# Patient Record
Sex: MALE | Race: WHITE | Employment: OTHER | URBAN - NONMETROPOLITAN AREA
[De-identification: names, ages, dates, MRNs, and addresses within clinical notes are randomized per-mention and may not be internally consistent; named-entity substitution may affect disease eponyms.]

---

## 2017-06-29 ENCOUNTER — APPOINTMENT (EMERGENCY)
Dept: RADIOLOGY | Facility: HOSPITAL | Age: 37
End: 2017-06-29
Payer: COMMERCIAL

## 2017-06-29 ENCOUNTER — HOSPITAL ENCOUNTER (EMERGENCY)
Facility: HOSPITAL | Age: 37
Discharge: HOME | End: 2017-06-29
Attending: EMERGENCY MEDICINE | Admitting: EMERGENCY MEDICINE
Payer: COMMERCIAL

## 2017-06-29 VITALS
BODY MASS INDEX: 33.13 KG/M2 | SYSTOLIC BLOOD PRESSURE: 176 MMHG | TEMPERATURE: 97.6 F | HEART RATE: 68 BPM | HEIGHT: 73 IN | RESPIRATION RATE: 16 BRPM | WEIGHT: 250 LBS | OXYGEN SATURATION: 96 % | DIASTOLIC BLOOD PRESSURE: 72 MMHG

## 2017-06-29 DIAGNOSIS — G62.9 NEUROPATHY: ICD-10-CM

## 2017-06-29 DIAGNOSIS — S97.112A CRUSHING INJURY OF LEFT GREAT TOE, INITIAL ENCOUNTER: Primary | ICD-10-CM

## 2017-06-29 PROCEDURE — 99283 EMERGENCY DEPT VISIT LOW MDM: CPT

## 2017-06-29 PROCEDURE — 73630 X-RAY EXAM OF FOOT: CPT

## 2017-06-29 RX ORDER — FUROSEMIDE 40 MG/1
40 TABLET ORAL 2 TIMES DAILY
COMMUNITY
End: 2018-02-13

## 2017-06-29 RX ORDER — OXYCODONE AND ACETAMINOPHEN 10; 325 MG/1; MG/1
1 TABLET ORAL EVERY 4 HOURS PRN
COMMUNITY

## 2017-06-29 RX ORDER — CLONIDINE HYDROCHLORIDE 0.3 MG/1
0.3 TABLET ORAL 3 TIMES DAILY
COMMUNITY

## 2017-06-29 RX ORDER — FERROUS SULFATE 325(65) MG
325 TABLET ORAL 2 TIMES DAILY WITH MEALS
COMMUNITY

## 2017-06-29 RX ORDER — DOXAZOSIN 2 MG/1
2 TABLET ORAL
COMMUNITY

## 2017-06-29 RX ORDER — ALPRAZOLAM 1 MG/1
TABLET ORAL
COMMUNITY

## 2017-06-29 RX ORDER — METOPROLOL TARTRATE 50 MG/1
50 TABLET, FILM COATED ORAL EVERY EVENING
COMMUNITY
End: 2018-02-13

## 2017-06-29 RX ORDER — LOSARTAN POTASSIUM 100 MG/1
100 TABLET ORAL DAILY
COMMUNITY

## 2017-06-29 RX ORDER — MELATONIN
1000 2 TIMES DAILY
COMMUNITY

## 2017-06-29 RX ORDER — NIFEDIPINE 60 MG/1
60 TABLET, FILM COATED, EXTENDED RELEASE ORAL 2 TIMES DAILY
COMMUNITY

## 2017-06-29 RX ORDER — HYDRALAZINE HYDROCHLORIDE 50 MG/1
50 TABLET, FILM COATED ORAL 3 TIMES DAILY
COMMUNITY

## 2017-06-29 RX ORDER — ASPIRIN 81 MG/1
81 TABLET, CHEWABLE ORAL DAILY
COMMUNITY

## 2018-02-13 ENCOUNTER — APPOINTMENT (EMERGENCY)
Dept: RADIOLOGY | Facility: HOSPITAL | Age: 38
End: 2018-02-13
Payer: COMMERCIAL

## 2018-02-13 ENCOUNTER — HOSPITAL ENCOUNTER (EMERGENCY)
Facility: HOSPITAL | Age: 38
Discharge: HOME | End: 2018-02-14
Attending: EMERGENCY MEDICINE | Admitting: EMERGENCY MEDICINE
Payer: COMMERCIAL

## 2018-02-13 VITALS
OXYGEN SATURATION: 100 % | HEIGHT: 73 IN | TEMPERATURE: 98.9 F | RESPIRATION RATE: 18 BRPM | SYSTOLIC BLOOD PRESSURE: 190 MMHG | DIASTOLIC BLOOD PRESSURE: 83 MMHG | HEART RATE: 67 BPM | BODY MASS INDEX: 32.01 KG/M2 | WEIGHT: 241.5 LBS

## 2018-02-13 DIAGNOSIS — T14.8XXA CRUSH INJURY: ICD-10-CM

## 2018-02-13 DIAGNOSIS — S92.301A FRACTURE OF UNSPECIFIED METATARSAL BONE(S), RIGHT FOOT, INITIAL ENCOUNTER FOR CLOSED FRACTURE: Primary | ICD-10-CM

## 2018-02-13 DIAGNOSIS — R03.0 ELEVATED BLOOD PRESSURE READING: ICD-10-CM

## 2018-02-13 PROCEDURE — 73630 X-RAY EXAM OF FOOT: CPT

## 2018-02-13 RX ORDER — METOPROLOL SUCCINATE 50 MG/1
50 TABLET, EXTENDED RELEASE ORAL EVERY EVENING
COMMUNITY
Start: 2017-12-11

## 2018-02-13 RX ORDER — METOPROLOL TARTRATE 100 MG/1
100 TABLET ORAL EVERY MORNING
COMMUNITY
End: 2018-02-13

## 2018-02-13 RX ORDER — METOPROLOL SUCCINATE 100 MG/1
100 TABLET, EXTENDED RELEASE ORAL EVERY MORNING
COMMUNITY
Start: 2017-12-11

## 2018-02-13 RX ORDER — CHLORTHALIDONE 50 MG/1
50 TABLET ORAL 2 TIMES DAILY
COMMUNITY

## 2018-02-13 RX ORDER — HYDROCODONE BITARTRATE AND ACETAMINOPHEN 5; 325 MG/1; MG/1
1 TABLET ORAL ONCE
Status: COMPLETED | OUTPATIENT
Start: 2018-02-13 | End: 2018-02-13

## 2018-02-13 RX ADMIN — HYDROCODONE BITARTRATE AND ACETAMINOPHEN 1 TABLET: 5; 325 TABLET ORAL at 23:00

## 2018-02-14 PROCEDURE — 99283 EMERGENCY DEPT VISIT LOW MDM: CPT

## 2018-02-14 NOTE — DISCHARGE INSTRUCTIONS
Make sure you take off your splint several times a day and check for foot 4 ulcerations as you are diabetic  Do not wear your splint while sleeping  Make sure you follow-up with Orthopedics as well as Podiatry  Was unable to reduce your great toe  Splint Care   AMBULATORY CARE:   Splint care  is important to help protect your splint until it comes off  Some splints are made of fiberglass or plaster that will need to dry and harden  Splint care will help the splint dry and harden correctly  Even after your splint hardens, it can be damaged  Seek care immediately if:   · You have increased pain  · Your fingers or toes are numb or tingling  · You feel burning or stinging around your injury  · Your nails, fingers, or toes turn pale, blue, or gray, and feel cold  · You have new or increased trouble moving your fingers or toes  · Your swelling gets worse  · The skin under your splint is bleeding or leaking pus  Contact your healthcare provider if:   · Your hard splint gets wet or is damaged  · You have a fever  · Your splint feels tighter  · You have itchy, dry skin under your splint that is getting worse  · The skin under your splint is red, or you have a new sore  · You notice a bad smell coming from your splint  · You have questions or concerns about your condition or care  How to care for your splint:   · Wait for your hard splint to harden completely  You may have to wait up to 3 days before you can walk on a plaster splint  · Check your splint and the skin around it each day  Check your splint for damage, such as cracks and breaks  Check your skin for redness, increased swelling, and sores  Loosen the elastic bandage around your splint if it feels too tight  · Keep your splint clean and dry  Keep dirt out of your splint  Before you bathe, wrap your hard splint with 2 layers of plastic  Then put a plastic bag over it  Keep the plastic bag tightly sealed   You can also ask your healthcare provider about waterproof shields  Do not put your hard splint in the water , even with a plastic bag over it  A wet splint can make your skin itchy, and may lead to infection  · Do not put powders or deodorants inside your splint  These can dry your skin and increase itching  · Do not try to scratch the skin inside your hard splint with sharp objects  Sharp objects can break off inside your splint or hurt your skin  · Do not pull the padding out of your splint  The padding inside your splint protects your skin  You may develop a sore on your skin if you take out the padding  Follow up with your healthcare provider as directed within 1 to 2 weeks:  Write down your questions so you remember to ask them during your visits  © 2017 [address] [employer] Information is for End User's use only and may not be sold, redistributed or otherwise used for commercial purposes  All illustrations and images included in CareNotes® are the copyrighted property of [employer]  or Candido Abad  The above information is an  only  It is not intended as medical advice for individual conditions or treatments  Talk to your doctor, nurse or pharmacist before following any medical regimen to see if it is safe and effective for you  MAKE SURE YOU CALL PODIATRY FOR POSSIBLE GREAT TOE SUBLUXATION THAT WE COULD NOT REDUCE    YOU MUST FOLLOW UP WITH ORTHO !! Foot Fracture in Adults   WHAT YOU NEED TO KNOW:   A foot fracture is a break in one or more of the bones in your foot  Foot fractures are commonly caused by trauma, falls, or repeated stress injuries  DISCHARGE INSTRUCTIONS:   Medicines:   · Antibiotics: This medicine is given to help treat or prevent an infection caused by bacteria  · NSAIDs:  These medicines decrease swelling and pain  NSAIDs are available without a doctor's order  Ask which medicine is right for you   Ask how much to take and when to take it  Take as directed  NSAIDs can cause stomach bleeding and kidney problems if not taken correctly  · Pain medicine: You may be given a prescription medicine to decrease pain  Do not wait until the pain is severe before you take this medicine  · Take your medicine as directed  Contact your healthcare provider if you think your medicine is not helping or if you have side effects  Tell him or her if you are allergic to any medicine  Keep a list of the medicines, vitamins, and herbs you take  Include the amounts, and when and why you take them  Bring the list or the pill bottles to follow-up visits  Carry your medicine list with you in case of an emergency  Follow up with your healthcare provider or bone specialist as directed: You may need to return to have your cast, splint, external fixation devices, or stitches removed  You may also need to return for tests to make sure your foot is healing  Write down your questions so you remember to ask them during your visits  Pin care: If you have pins in your foot, you will need to clean them daily  Cleaning the pins can help prevent an infection  Ask for more information about pin care  Wound care:  Carefully wash the wound with soap and water  Dry the area and put on new, clean bandages as directed  Change your bandages when they get wet or dirty  Self-care:   · Rest:  You may need to rest your foot and avoid activities that cause pain  For stress fractures, you will need to avoid the activity that caused the fracture until it heals  Ask when you can return to your normal activities such as work and sports  · Ice:  Ice helps decrease swelling and pain  Ice may also help prevent tissue damage  Use an ice pack or put crushed ice in a plastic bag  Cover it with a towel, and place it on your foot for 15 to 20 minutes every hour as directed  · Elevate your foot:  Raise your foot at or above the level of your heart as often as you can   This will help decrease swelling and pain  Prop your foot on pillows or blankets to keep it elevated comfortably  · Physical therapy: Once your foot has healed, a physical therapist can teach you exercises to help improve movement and strength, and to decrease pain  Cast or splint care:   · Check the skin around your cast and splint daily for any redness or open areas  · Do not use a sharp or pointed object to scratch your skin under the cast or splint  · Do not remove your splint unless your healthcare provider or orthopedic surgeon says it is okay  Bathing with a cast or splint:  Do not let your cast or splint get wet  Before bathing, cover the cast or splint with a plastic bag  Tape the bag to your skin above the cast or splint to seal out the water  Keep your foot out of the water in case the bag leaks  Ask when it is okay to take a bath or shower  Assistive devices: You may be given a hard-soled shoe to wear while your foot is healing  You also may need to use crutches to help you walk while your foot heals  It is important to use your crutches correctly  Ask for more information about how to use crutches  Contact your healthcare provider or bone specialist if:   · You have a fever  · You have new sores around your boot, cast, or splint  · You have new or worsening trouble moving your foot  · You notice a foul smell coming from under your cast     · Your boot, cast, or splint gets damaged  · You have questions or concerns about your condition or care  Seek care immediately or call 911 if:   · The pain in your injured foot gets worse even after you rest and take pain medicine  · The skin or toes of your foot become numb, swollen, cold, white, or blue  · You have more pain or swelling than you did before the cast was put on  · Your wound is draining fluid or pus  · Blood soaks through your bandage  · Your leg feels warm, tender, and painful  It may look swollen and red      · You suddenly feel lightheaded and short of breath  · You have chest pain when you take a deep breath or cough  You may cough up blood  © 2017 [address] [employer] Information is for End User's use only and may not be sold, redistributed or otherwise used for commercial purposes  All illustrations and images included in CareNotes® are the copyrighted property of A [employer] M [employer]  or Candido Abad  The above information is an  only  It is not intended as medical advice for individual conditions or treatments  Talk to your doctor, nurse or pharmacist before following any medical regimen to see if it is safe and effective for you

## 2018-02-14 NOTE — ED PROVIDER NOTES
History  Chief Complaint   Patient presents with    Foot Injury     Pt reports he dropped a ladder onto his right foot this afternoon  Right foot swollen and bruised  Patient is a 49-year-old male with a history of chronic kidney disease stage 4, diabetes, he is not on dialysis coming in today after he dropped a ladder on his foot earlier this afternoon  He did not fall, hit his head, lose consciousness  Patient with pain, swelling, tenderness throughout his right foot  He has difficulty walking on this foot  History provided by:  Patient   used: No    Foot Injury - Major   Location:  Foot  Injury: yes    Mechanism of injury comment:  Ladder fell on foot  Foot location:  R foot  Pain details:     Quality: Pain  Severity:  Moderate    Onset quality:  Sudden    Timing:  Constant    Progression:  Worsening  Chronicity:  New  Dislocation: no    Foreign body present:  No foreign bodies  Prior injury to area:  No  Relieved by:  None tried  Worsened by:  Bearing weight  Ineffective treatments:  None tried  Associated symptoms: swelling    Associated symptoms: no back pain, no decreased ROM, no fatigue, no fever, no itching, no muscle weakness, no neck pain, no numbness, no stiffness and no tingling    Risk factors: no concern for non-accidental trauma, no frequent fractures, no known bone disorder, no obesity and no recent illness        Prior to Admission Medications   Prescriptions Last Dose Informant Patient Reported? Taking?    ALPRAZolam (XANAX) 1 mg tablet   Yes Yes   Sig: Take by mouth daily at bedtime as needed for anxiety   NIFEdipine ER (ADALAT CC) 60 MG 24 hr tablet   Yes Yes   Sig: Take 60 mg by mouth 2 (two) times a day   aspirin 81 mg chewable tablet   Yes Yes   Sig: Chew 81 mg daily   chlorthalidone (HYGROTEN) 50 MG tablet   Yes Yes   Sig: Take 50 mg by mouth 2 (two) times a day   cholecalciferol (VITAMIN D3) 1,000 units tablet   Yes Yes   Sig: Take 1,000 Units by mouth 2 (two) times a day   cloNIDine (CATAPRES) 0 3 mg tablet   Yes Yes   Sig: Take 0 3 mg by mouth 3 (three) times a day   doxazosin (CARDURA) 2 mg tablet   Yes Yes   Sig: Take 2 mg by mouth daily at bedtime   ferrous sulfate 325 (65 Fe) mg tablet   Yes Yes   Sig: Take 325 mg by mouth 2 (two) times a day with meals     hydrALAZINE (APRESOLINE) 50 mg tablet   Yes Yes   Sig: Take 50 mg by mouth 3 (three) times a day   insulin aspart (NovoLOG) 100 units/mL injection   Yes Yes   Sig: Inject under the skin 3 (three) times a day before meals Sliding scale and carb count    losartan (COZAAR) 100 MG tablet   Yes Yes   Sig: Take 100 mg by mouth daily   lovastatin (ALTOPREV) 40 MG 24 hr tablet   Yes Yes   Sig: Take 40 mg by mouth daily at bedtime   metoprolol succinate (TOPROL-XL) 100 mg 24 hr tablet   Yes Yes   Sig: Take 100 mg by mouth every morning   metoprolol succinate (TOPROL-XL) 50 mg 24 hr tablet   Yes Yes   Sig: Take 50 mg by mouth every evening   oxyCODONE-acetaminophen (PERCOCET)  mg per tablet 2/13/2018 at 0700  Yes Yes   Sig: Take 1 tablet by mouth every 4 (four) hours as needed for moderate pain      Facility-Administered Medications: None       Past Medical History:   Diagnosis Date    Chronic pain     Diabetes mellitus ([facility] [address] )     Hyperlipidemia     Hypertension     Neuropathy     Renal disorder        Past Surgical History:   Procedure Laterality Date    APPENDECTOMY      TONSILLECTOMY         History reviewed  No pertinent family history  I have reviewed and agree with the history as documented  Social History   Substance Use Topics    Smoking status: Current Every Day Smoker     Packs/day: 0 50     Types: Cigarettes    Smokeless tobacco: Never Used    Alcohol use No        Review of Systems   Constitutional: Negative for fatigue and fever  Respiratory: Negative for cough, chest tightness, shortness of breath and wheezing      Musculoskeletal: Negative for back pain, neck pain and stiffness  Skin: Negative for itching and rash  All other systems reviewed and are negative  Physical Exam  ED Triage Vitals [02/13/18 2205]   Temperature Pulse Respirations Blood Pressure SpO2   98 9 °F (37 2 °C) 67 18 (!) 190/83 100 %      Temp Source Heart Rate Source Patient Position - Orthostatic VS BP Location FiO2 (%)   Temporal Monitor Sitting Left arm --      Pain Score       Worst Possible Pain           Orthostatic Vital Signs  Vitals:    02/13/18 2205   BP: (!) 190/83   Pulse: 67   Patient Position - Orthostatic VS: Sitting       Physical Exam   Constitutional: He appears well-developed and well-nourished  HENT:   Head: Normocephalic and atraumatic  Mouth/Throat: Oropharynx is clear and moist    Eyes: Conjunctivae and EOM are normal  Pupils are equal, round, and reactive to light  Neck: Normal range of motion  Pulmonary/Chest: No respiratory distress  Musculoskeletal:        Right ankle: He exhibits decreased range of motion, swelling and ecchymosis  He exhibits no deformity, no laceration and normal pulse  Feet:    Patient with full active range of motion of the bilateral hips, knees, and left ankle  Dorsalis pedis 2+ equal bilateral   Psychiatric: He has a normal mood and affect  His behavior is normal  Judgment and thought content normal    Nursing note and vitals reviewed        ED Medications  Medications   HYDROcodone-acetaminophen (NORCO) 5-325 mg per tablet 1 tablet (1 tablet Oral Given 2/13/18 2300)       Diagnostic Studies  Results Reviewed     None                 XR foot 3+ views RIGHT    (Results Pending)              Procedures  Static Splint Application  Date/Time: 2/14/2018 12:25 AM  Performed by: Gera Hassan  Authorized by: Gera Hassan     Patient location:  Bedside  Procedure performed by emergency physician: No    Other Assisting Provider: Yes (comment)    Consent:     Consent obtained:  Verbal    Consent given by:  Patient    Risks discussed: Discoloration, numbness, pain and swelling    Alternatives discussed:  No treatment  Indication:     Indications: fracture    Pre-procedure details:     Sensation:  Numbness (Per patient this is from his neuropathy)  Procedure details:     Laterality:  Right    Location:  Leg    Leg:  R lower leg    Strapping: no      Splint type: Right posterior  Supplies:  Ortho-Glass  Post-procedure details:     Pain:  Unchanged    Sensation:  Normal    Neurovascular Exam: skin pink      Patient tolerance of procedure: Tolerated well, no immediate complications           Phone Contacts  ED Phone Contact    ED Course  ED Course                                MDM  Number of Diagnoses or Management Options  Crush injury:   Elevated blood pressure reading:   Fracture of unspecified metatarsal bone(s), right foot, initial encounter for closed fracture:   Diagnosis management comments:   11:34 PM  Patient and family aware of pending read on xray and not officially read  Concern for 1st MT and 2nd MT fracture  Questionable subluxation of great toe  Multiple attempts at reduction was unsuccessful  Patient aware of this and remains Nv  Patient does have peripheral neuropathy at baseline  12:25 AM  Splint applied and patient with neuropathy of right lower extremity  He is agreeable for follow-up with Podiatry is also Orthopedics  I discussed with him at length regarding my concern for subluxation and my inability to reduce at this time  Patient splint is applied  Instructions and discussed with him regarding need to performSkin checks several times throughout the day given he is diabetic with neuropathy  patient states he has percocet at home and does not need any pain medications   Patient aware of my concern        Amount and/or Complexity of Data Reviewed  Tests in the radiology section of CPT®: ordered and reviewed  Independent visualization of images, tracings, or specimens: yes (X-ray concerning for fracture at the proximal first metatarsal as well as head of 2nd metatarsal   Questionable 1st partial subluxation )      CritCare Time    Disposition  Final diagnoses:   Fracture of unspecified metatarsal bone(s), right foot, initial encounter for closed fracture   Crush injury     Time reflects when diagnosis was documented in both MDM as applicable and the Disposition within this note     Time User Action Codes Description Comment    2/13/2018 11:31 PM Aidee Lopez Add [S92 301A] Fracture of unspecified metatarsal bone(s), right foot, initial encounter for closed fracture     2/13/2018 11:31 PM Liliane Lopez  [id number]] Crush injury       ED Disposition     None      Follow-up Information     Follow up With Specialties Details Why Cody Sethi MD Family Medicine Schedule an appointment as soon as possible for a visit in 3 days  [address]  [facility] [address]  [phone]      Juan Quinn MD Orthopedic Surgery Schedule an appointment as soon as possible for a visit in 1 day  [address] [facility], [address] [facility]   [address]  [address]  [phone]          Patient's Medications   Discharge Prescriptions    No medications on file     No discharge procedures on file      ED Provider  Electronically Signed by           Litzy Solis DO  02/18/18 1654

## 2018-06-10 ENCOUNTER — HOSPITAL ENCOUNTER (EMERGENCY)
Facility: HOSPITAL | Age: 38
Discharge: HOME | End: 2018-06-10
Attending: EMERGENCY MEDICINE | Admitting: EMERGENCY MEDICINE
Payer: COMMERCIAL

## 2018-06-10 VITALS
TEMPERATURE: 98 F | DIASTOLIC BLOOD PRESSURE: 72 MMHG | BODY MASS INDEX: 30.25 KG/M2 | WEIGHT: 229.28 LBS | RESPIRATION RATE: 17 BRPM | SYSTOLIC BLOOD PRESSURE: 160 MMHG | OXYGEN SATURATION: 98 % | HEART RATE: 60 BPM

## 2018-06-10 DIAGNOSIS — L30.9 DERMATITIS: Primary | ICD-10-CM

## 2018-06-10 PROCEDURE — 96372 THER/PROPH/DIAG INJ SC/IM: CPT

## 2018-06-10 PROCEDURE — 99282 EMERGENCY DEPT VISIT SF MDM: CPT

## 2018-06-10 RX ORDER — METHYLPREDNISOLONE SODIUM SUCCINATE 125 MG/2ML
80 INJECTION, POWDER, LYOPHILIZED, FOR SOLUTION INTRAMUSCULAR; INTRAVENOUS ONCE
Status: COMPLETED | OUTPATIENT
Start: 2018-06-10 | End: 2018-06-10

## 2018-06-10 RX ORDER — PREDNISONE 10 MG/1
TABLET ORAL
Qty: 84 TABLET | Refills: 0 | Status: SHIPPED | OUTPATIENT
Start: 2018-06-10

## 2018-06-10 RX ORDER — LEVOCETIRIZINE DIHYDROCHLORIDE 5 MG/1
5 TABLET, FILM COATED ORAL EVERY EVENING
Qty: 10 TABLET | Refills: 0 | Status: SHIPPED | OUTPATIENT
Start: 2018-06-10 | End: 2018-06-20

## 2018-06-10 RX ORDER — DIPHENHYDRAMINE HCL 25 MG
25 TABLET ORAL ONCE
Status: COMPLETED | OUTPATIENT
Start: 2018-06-10 | End: 2018-06-10

## 2018-06-10 RX ORDER — HYDROXYZINE HYDROCHLORIDE 25 MG/1
25 TABLET, FILM COATED ORAL EVERY 6 HOURS PRN
Qty: 20 TABLET | Refills: 0 | Status: SHIPPED | OUTPATIENT
Start: 2018-06-10

## 2018-06-10 RX ORDER — LORATADINE 10 MG/1
10 TABLET ORAL ONCE
Status: COMPLETED | OUTPATIENT
Start: 2018-06-10 | End: 2018-06-10

## 2018-06-10 RX ORDER — FAMOTIDINE 20 MG/1
20 TABLET, FILM COATED ORAL ONCE
Status: COMPLETED | OUTPATIENT
Start: 2018-06-10 | End: 2018-06-10

## 2018-06-10 RX ADMIN — DIPHENHYDRAMINE HCL 25 MG: 25 TABLET ORAL at 11:58

## 2018-06-10 RX ADMIN — LORATADINE 10 MG: 10 TABLET ORAL at 11:58

## 2018-06-10 RX ADMIN — FAMOTIDINE 20 MG: 20 TABLET ORAL at 11:58

## 2018-06-10 RX ADMIN — METHYLPREDNISOLONE SODIUM SUCCINATE 80 MG: 125 INJECTION, POWDER, FOR SOLUTION INTRAMUSCULAR; INTRAVENOUS at 11:59

## 2018-06-10 NOTE — DISCHARGE INSTRUCTIONS
Poison Ivy   WHAT YOU NEED TO KNOW:   Poison ivy is a plant that can cause an itchy, uncomfortable rash on your skin  Poison ivy grows as a shrub or vine in woods, fields, and areas of thick [place]  It has 3 bright green leaves on each stem that turn red in [place]  DISCHARGE INSTRUCTIONS:   Medicines:   · Antiseptic or drying creams or ointments: These medicines may be used to dry out the rash and decrease the itching  These products may be available without a doctor's order  · Steroids: This medicine helps decrease itching and inflammation  It can be given as a cream to apply to your skin or as a pill  · Antihistamines: This medicine may help decrease itching and help you sleep  It is available without a doctor's order  · Take your medicine as directed  Contact your healthcare provider if you think your medicine is not helping or if you have side effects  Tell him of her if you are allergic to any medicine  Keep a list of the medicines, vitamins, and herbs you take  Include the amounts, and when and why you take them  Bring the list or the pill bottles to follow-up visits  Carry your medicine list with you in case of an emergency  Follow up with your healthcare provider as directed:  Write down your questions so you remember to ask them during your visits  How your poison ivy rash spreads: You cannot spread poison ivy by touching your rash or the liquid from your blisters  Poison ivy is spread only if you scratch your skin while it still has oil on it  You may think your rash is spreading because new rashes appear over a number of days  This happens because areas covered by thin skin break out in a rash first  Your face or forearms may develop a rash before thicker areas, such as the palms of your hands  Self-care:   · Keep your rash clean and dry:  Wash it with soap and water  Gently pat it dry with a clean towel  · Try not to scratch or rub your rash:   This can cause your skin to become infected  · Use a compress on your rash:  Dip a clean washcloth in cool water  Wring it out and place it on your rash  Leave the washcloth on your skin for 15 minutes  Do this at least 3 times per day  · Take a cornstarch or oatmeal bath: If your rash is too large to cover with wet washcloths, take 3 or 4 cornstarch baths daily  Mix 1 pound of cornstarch with a little water to make a paste  Add the paste to a tub full of water and mix well  You may also use colloidal oatmeal in the bath water  Use lukewarm water  Avoid hot water because it may cause your itching to increase  Prevent a poison ivy rash in the future:   · Wear skin protection:  Wear long pants, a long-sleeved shirt, and gloves  Use a skin block lotion to protect your skin from poison ivy oil  You can find this at a drugstore without a prescription  · Wash clothing after possible exposure: If you think you have been near a poison ivy plant, wash the clothes you were wearing separately from other clothes  Rinse the washing machine well after you take the clothes out  Scrub boots and shoes with warm, soapy water  Dry clean items and clothing that you cannot wash in water  Poison ivy oil is sticky and can stay on surfaces for a long time  It can cause a new rash even years later  · Bathe your pet:  Use warm water and shampoo on your pet's fur  This will prevent the spread of oil to your skin, car, and home  Wear long sleeves, long pants, and gloves while washing pets or any items that may have oil on them  · Reduce exposure to poison ivy:  Do not touch plants that look like poison ivy  Keep your yard free of poison ivy  While protecting your skin, remove the plant and the roots  Place them in a plastic bag and seal the bag tightly  · Do not burn poison ivy plants: This can spread the oil through the air  If you breathe the oil into your lungs, you could have swelling and serious breathing problems   Oil that clings to the fire [place]ia can land on your skin and cause a rash  Contact your healthcare provider if:   · You have pus, soft yellow scabs, or tenderness on the rash  · The itching gets worse or keeps you awake at night  · The rash covers more than 1/4 of your skin or spreads to your eyes, mouth, or genital area  · The rash is not better after 2 to 3 weeks  · You have tender, swollen glands on the sides of your neck  · You have swelling in your arms and legs  · You have questions or concerns about your condition or care  Return to the emergency department if:   · You have a fever  · You have redness, swelling, and tenderness around the rash  · You have trouble breathing  © 2017 26[address] [facility] Information is for End User's use only and may not be sold, redistributed or otherwise used for commercial purposes  All illustrations and images included in CareNotes® are the copyrighted property of A D A M , Inc  or Candido Abad  The above information is an  only  It is not intended as medical advice for individual conditions or treatments  Talk to your doctor, nurse or pharmacist before following any medical regimen to see if it is safe and effective for you

## 2018-06-10 NOTE — ED PROVIDER NOTES
History  Chief Complaint   Patient presents with    Rash     States saw family doctor and was put on prednisone for "poison"  states it is worse, on arms and head       History provided by:  Patient and medical records  Rash   Location: bilateral arms, neck, abdomen, left buttock, right foot  Quality: blistering, burning, itchiness, painful, redness and swelling    Quality: not scaling and not weeping    Pain details:     Quality:  Burning, itching and sore    Severity:  Moderate    Onset quality:  Gradual    Duration:  5 days    Timing:  Constant    Progression:  Worsening  Severity:  Moderate  Onset quality:  Gradual  Duration:  5 days  Timing:  Constant  Progression:  Worsening  Chronicity:  New  Context: plant contact (walking in woods  suspected poison ivy)    Context: not exposure to similar rash, not food, not insect bite/sting, not medications, not nuts and not sick contacts    Relieved by:  Nothing  Worsened by:  Nothing  Ineffective treatments:  Antihistamines, anti-itch cream and topical steroids (saw pcp 6/7/18 and prescribed prednisone and is taking (30mg x3d, 20mg x3d, 10mg x3d)  using PM benadryl dose  topical hydrocortisone and calamine lotion during day)  Associated symptoms: no abdominal pain, no diarrhea, no fatigue, no fever, no headaches, no hoarse voice, no induration, no joint pain, no myalgias, no nausea, no periorbital edema, no shortness of breath, no sore throat, no throat swelling, no tongue swelling, no URI, not vomiting and not wheezing        Prior to Admission Medications   Prescriptions Last Dose Informant Patient Reported? Taking?    ALPRAZolam (XANAX) 1 mg tablet   Yes Yes   Sig: Take by mouth daily at bedtime as needed for anxiety   NIFEdipine ER (ADALAT CC) 60 MG 24 hr tablet   Yes Yes   Sig: Take 60 mg by mouth 2 (two) times a day   aspirin 81 mg chewable tablet   Yes Yes   Sig: Chew 81 mg daily   chlorthalidone (HYGROTEN) 50 MG tablet   Yes Yes   Sig: Take 50 mg by mouth 2 (two) times a day   cholecalciferol (VITAMIN D3) 1,000 units tablet   Yes Yes   Sig: Take 1,000 Units by mouth 2 (two) times a day   cloNIDine (CATAPRES) 0 3 mg tablet   Yes Yes   Sig: Take 0 3 mg by mouth 3 (three) times a day   doxazosin (CARDURA) 2 mg tablet   Yes Yes   Sig: Take 2 mg by mouth daily at bedtime   ferrous sulfate 325 (65 Fe) mg tablet   Yes Yes   Sig: Take 325 mg by mouth 2 (two) times a day with meals     hydrALAZINE (APRESOLINE) 50 mg tablet   Yes Yes   Sig: Take 50 mg by mouth 3 (three) times a day   insulin aspart (NovoLOG) 100 units/mL injection   Yes Yes   Sig: Inject under the skin 3 (three) times a day before meals Sliding scale and carb count    losartan (COZAAR) 100 MG tablet   Yes Yes   Sig: Take 100 mg by mouth daily   lovastatin (ALTOPREV) 40 MG 24 hr tablet   Yes Yes   Sig: Take 40 mg by mouth daily at bedtime   metoprolol succinate (TOPROL-XL) 100 mg 24 hr tablet   Yes Yes   Sig: Take 100 mg by mouth every morning   metoprolol succinate (TOPROL-XL) 50 mg 24 hr tablet   Yes Yes   Sig: Take 50 mg by mouth every evening   oxyCODONE-acetaminophen (PERCOCET)  mg per tablet   Yes Yes   Sig: Take 1 tablet by mouth every 4 (four) hours as needed for moderate pain      Facility-Administered Medications: None       Past Medical History:   Diagnosis Date    Chronic kidney failure, stage 4 (severe) (HCC)     Chronic pain     Diabetes mellitus ([facility] [address] )     Hyperlipidemia     Hypertension     Neuropathy     Renal disorder        Past Surgical History:   Procedure Laterality Date    APPENDECTOMY      TONSILLECTOMY         History reviewed  No pertinent family history  I have reviewed and agree with the history as documented      Social History   Substance Use Topics    Smoking status: Current Every Day Smoker     Packs/day: 0 50     Types: Cigarettes    Smokeless tobacco: Never Used    Alcohol use No        Review of Systems   Constitutional: Negative for activity change, appetite change, chills, diaphoresis, fatigue, fever and unexpected weight change  HENT: Negative for congestion, ear pain, hoarse voice, rhinorrhea, sinus pressure, sore throat and tinnitus  Eyes: Negative for visual disturbance  Respiratory: Negative for cough, chest tightness, shortness of breath and wheezing  Cardiovascular: Negative for chest pain, palpitations and leg swelling  Gastrointestinal: Negative for abdominal pain, constipation, diarrhea, nausea and vomiting  Genitourinary: Negative for dysuria, flank pain, frequency, hematuria and urgency  Musculoskeletal: Negative for arthralgias, back pain, joint swelling, myalgias and neck pain  Skin: Positive for rash  Negative for color change, pallor and wound  Neurological: Negative for dizziness, tremors, syncope, weakness, numbness and headaches  Physical Exam  Physical Exam   Constitutional: He is oriented to person, place, and time  He appears well-developed and well-nourished  No distress  HENT:   Head: Normocephalic and atraumatic  Nose: Nose normal    Mouth/Throat: Oropharynx is clear and moist    Eyes: Conjunctivae are normal  Pupils are equal, round, and reactive to light  Neck: Neck supple  Cardiovascular: Normal rate, regular rhythm, normal heart sounds and intact distal pulses  No murmur heard  Pulmonary/Chest: Effort normal and breath sounds normal  No stridor  No respiratory distress  He exhibits no tenderness  Abdominal: Soft  Bowel sounds are normal    Musculoskeletal: He exhibits no edema or tenderness  Neurological: He is alert and oriented to person, place, and time  Skin: Skin is warm and dry  Capillary refill takes less than 2 seconds  Rash (vesiculopapular rash most confluent on bilateral forearms, lesions on right side of neck, right cheek, right dorsal foot and ankle, left buttock left upper abdomen) noted  No petechiae noted  He is not diaphoretic  Psychiatric: He has a normal mood and affect  Nursing note and vitals reviewed  Vital Signs  ED Triage Vitals [06/10/18 1054]   Temperature Pulse Respirations Blood Pressure SpO2   98 °F (36 7 °C) 66 17 161/77 99 %      Temp Source Heart Rate Source Patient Position - Orthostatic VS BP Location FiO2 (%)   Temporal Monitor Sitting Right arm --      Pain Score       6           Vitals:    06/10/18 1054 06/10/18 1204   BP: 161/77 160/72   Pulse: 66 60   Patient Position - Orthostatic VS: Sitting Lying       Visual Acuity      ED Medications  Medications   loratadine (CLARITIN) tablet 10 mg (10 mg Oral Given 6/10/18 1158)   famotidine (PEPCID) tablet 20 mg (20 mg Oral Given 6/10/18 1158)   diphenhydrAMINE (BENADRYL) tablet 25 mg (25 mg Oral Given 6/10/18 1158)   methylPREDNISolone sodium succinate (Solu-MEDROL) injection 80 mg (80 mg Intramuscular Given 6/10/18 1159)       Diagnostic Studies  Results Reviewed     None                 No orders to display              Procedures  Procedures       Phone Contacts  ED Phone Contact    ED Course       MDM  Number of Diagnoses or Management Options  Dermatitis: new and does not require workup  Diagnosis management comments: Plan- increase prednisone dose from 30mg to 60mg and extend taper  Supportive tx, add atarax for daytime pruritus  Amount and/or Complexity of Data Reviewed  Decide to obtain previous medical records or to obtain history from someone other than the patient: yes (pcp visit 6/7/18)    Patient Progress  Patient progress: stable    CritCare Time    Disposition  Final diagnoses:   Dermatitis     Time reflects when diagnosis was documented in both MDM as applicable and the Disposition within this note     Time User Action Codes Description Comment    6/10/2018 12:09 PM Sejal Jordan [L30 9] Dermatitis       ED Disposition     ED Disposition Condition Comment    Discharge  [address] discharge to home/self care      Condition at discharge: Good        Follow-up Information     Follow up With Specialties Details Why Maurice Mcallister MD Family Medicine  ER followup [address]   [address]  [address] [place] [id number]  [phone]            Discharge Medication List as of 6/10/2018 12:14 PM      START taking these medications    Details   hydrOXYzine HCL (ATARAX) 25 mg tablet Take 1 tablet (25 mg total) by mouth every 6 (six) hours as needed for itching, Starting Sun 6/10/2018, Normal      levocetirizine (XYZAL) 5 MG tablet Take 1 tablet (5 mg total) by mouth every evening for 10 days, Starting Sun 6/10/2018, Until Wed 6/20/2018, Normal      predniSONE 10 mg tablet 60mg daily x 4days, 50mg daily x4days, 40mg daily x4days, 30mg daily x4days, 20mg daily x4days, 10mg daily x 4days, Normal         CONTINUE these medications which have NOT CHANGED    Details   ALPRAZolam (XANAX) 1 mg tablet Take by mouth daily at bedtime as needed for anxiety, Historical Med      aspirin 81 mg chewable tablet Chew 81 mg daily, Historical Med      chlorthalidone (HYGROTEN) 50 MG tablet Take 50 mg by mouth 2 (two) times a day, Historical Med      cholecalciferol (VITAMIN D3) 1,000 units tablet Take 1,000 Units by mouth 2 (two) times a day, Historical Med      cloNIDine (CATAPRES) 0 3 mg tablet Take 0 3 mg by mouth 3 (three) times a day, Historical Med      doxazosin (CARDURA) 2 mg tablet Take 2 mg by mouth daily at bedtime, Historical Med      ferrous sulfate 325 (65 Fe) mg tablet Take 325 mg by mouth 2 (two) times a day with meals  , Historical Med      hydrALAZINE (APRESOLINE) 50 mg tablet Take 50 mg by mouth 3 (three) times a day, Historical Med      insulin aspart (NovoLOG) 100 units/mL injection Inject under the skin 3 (three) times a day before meals Sliding scale and carb count , Historical Med      losartan (COZAAR) 100 MG tablet Take 100 mg by mouth daily, Historical Med      lovastatin (ALTOPREV) 40 MG 24 hr tablet Take 40 mg by mouth daily at bedtime, Historical Med      !! metoprolol succinate (TOPROL-XL) 100 mg 24 hr tablet Take 100 mg by mouth every morning, Starting Mon 12/11/2017, Historical Med      !! metoprolol succinate (TOPROL-XL) 50 mg 24 hr tablet Take 50 mg by mouth every evening, Starting Mon 12/11/2017, Historical Med      NIFEdipine ER (ADALAT CC) 60 MG 24 hr tablet Take 60 mg by mouth 2 (two) times a day, Historical Med      oxyCODONE-acetaminophen (PERCOCET)  mg per tablet Take 1 tablet by mouth every 4 (four) hours as needed for moderate pain, Historical Med       !! - Potential duplicate medications found  Please discuss with provider  No discharge procedures on file      ED Provider  Electronically Signed by           Jhon Carvajal PA-C  06/10/18 8455

## 2018-08-06 ENCOUNTER — HOSPITAL ENCOUNTER (EMERGENCY)
Facility: HOSPITAL | Age: 38
Discharge: HOME | End: 2018-08-06
Attending: EMERGENCY MEDICINE | Admitting: EMERGENCY MEDICINE
Payer: COMMERCIAL

## 2018-08-06 VITALS
HEART RATE: 67 BPM | DIASTOLIC BLOOD PRESSURE: 71 MMHG | OXYGEN SATURATION: 98 % | RESPIRATION RATE: 16 BRPM | SYSTOLIC BLOOD PRESSURE: 161 MMHG | BODY MASS INDEX: 30.58 KG/M2 | WEIGHT: 231.8 LBS | TEMPERATURE: 98.1 F

## 2018-08-06 DIAGNOSIS — L23.7 POISON IVY DERMATITIS: Primary | ICD-10-CM

## 2018-08-06 PROCEDURE — 99282 EMERGENCY DEPT VISIT SF MDM: CPT

## 2018-08-06 RX ORDER — PREDNISONE 20 MG/1
TABLET ORAL
Qty: 10 TABLET | Refills: 0 | Status: SHIPPED | OUTPATIENT
Start: 2018-08-06

## 2018-08-06 RX ORDER — PREDNISONE 20 MG/1
60 TABLET ORAL ONCE
Status: COMPLETED | OUTPATIENT
Start: 2018-08-06 | End: 2018-08-06

## 2018-08-06 RX ADMIN — PREDNISONE 60 MG: 20 TABLET ORAL at 21:17

## 2018-08-07 NOTE — DISCHARGE INSTRUCTIONS
USe prednisone as directed- As discussed, be awre of high sugars- use your sliding scale  Consult allergist about any tx so you don't have to use steroids frequerntly      Contact Dermatitis   WHAT YOU NEED TO KNOW:   Contact dermatitis is a skin rash  It develops when you touch something that irritates your skin or causes an allergic reaction  DISCHARGE INSTRUCTIONS:   Call 911 for any of the following:   · You have sudden trouble breathing  · Your throat swells and you have trouble eating  · Your face is swollen  Contact your healthcare provider if:   · You have a fever  · Your blisters are draining pus  · Your rash spreads or does not get better, even after treatment  · You have questions or concerns about your condition or care  Medicines:   · Medicines  help decrease itching and swelling  They will be given as a topical medicine to apply to your rash or as a pill  · Take your medicine as directed  Contact your healthcare provider if you think your medicine is not helping or if you have side effects  Tell him or her if you are allergic to any medicine  Keep a list of the medicines, vitamins, and herbs you take  Include the amounts, and when and why you take them  Bring the list or the pill bottles to follow-up visits  Carry your medicine list with you in case of an emergency  Manage contact dermatitis:   · Take short baths or showers in cool water  Use mild soap or soap-free cleansers  Add oatmeal, baking soda, or cornstarch to the bath water to help decrease skin irritation  · Avoid skin irritants , such as makeup, hair products, soaps, and cleansers  Use products that do not contain perfume or dye  · Apply a cool compress to your rash  This will help soothe your skin  · Keep your skin moist   Rub unscented cream or lotion on your skin to prevent dryness and itching  Do this right after a bath or shower when your skin is still damp    Follow up with your healthcare provider or dermatologist in 2 to 3 days:  Write down your questions so you remember to ask them during your visits  © 2017 [address] [employer] Information is for End User's use only and may not be sold, redistributed or otherwise used for commercial purposes  All illustrations and images included in CareNotes® are the copyrighted property of [employer]  or [employer]  The above information is an  only  It is not intended as medical advice for individual conditions or treatments  Talk to your doctor, nurse or pharmacist before following any medical regimen to see if it is safe and effective for you  Poison Ivy   WHAT YOU NEED TO KNOW:   Poison ivy is a plant that can cause an itchy, uncomfortable rash on your skin  Poison ivy grows as a shrub or vine in woods, fields, and areas of thick [place]  It has 3 bright green leaves on each stem that turn red in [place]  DISCHARGE INSTRUCTIONS:   Medicines:   · Antiseptic or drying creams or ointments: These medicines may be used to dry out the rash and decrease the itching  These products may be available without a doctor's order  · Steroids: This medicine helps decrease itching and inflammation  It can be given as a cream to apply to your skin or as a pill  · Antihistamines: This medicine may help decrease itching and help you sleep  It is available without a doctor's order  · Take your medicine as directed  Contact your healthcare provider if you think your medicine is not helping or if you have side effects  Tell him or her if you are allergic to any medicine  Keep a list of the medicines, vitamins, and herbs you take  Include the amounts, and when and why you take them  Bring the list or the pill bottles to follow-up visits  Carry your medicine list with you in case of an emergency    Follow up with your healthcare provider as directed:  Write down your questions so you remember to ask them during your visits  How your poison ivy rash spreads: You cannot spread poison ivy by touching your rash or the liquid from your blisters  Poison ivy is spread only if you scratch your skin while it still has oil on it  You may think your rash is spreading because new rashes appear over a number of days  This happens because areas covered by thin skin break out in a rash first  Your face or forearms may develop a rash before thicker areas, such as the palms of your hands  Self-care:   · Keep your rash clean and dry:  Wash it with soap and water  Gently pat it dry with a clean towel  · Try not to scratch or rub your rash: This can cause your skin to become infected  · Use a compress on your rash:  Dip a clean washcloth in cool water  Wring it out and place it on your rash  Leave the washcloth on your skin for 15 minutes  Do this at least 3 times per day  · Take a cornstarch or oatmeal bath: If your rash is too large to cover with wet washcloths, take 3 or 4 cornstarch baths daily  Mix 1 pound of cornstarch with a little water to make a paste  Add the paste to a tub full of water and mix well  You may also use colloidal oatmeal in the bath water  Use lukewarm water  Avoid hot water because it may cause your itching to increase  Prevent a poison ivy rash in the future:   · Wear skin protection:  Wear long pants, a long-sleeved shirt, and gloves  Use a skin block lotion to protect your skin from poison ivy oil  You can find this at a drugstore without a prescription  · Wash clothing after possible exposure: If you think you have been near a poison ivy plant, wash the clothes you were wearing separately from other clothes  Rinse the washing machine well after you take the clothes out  Scrub boots and shoes with warm, soapy water  Dry clean items and clothing that you cannot wash in water  Poison ivy oil is sticky and can stay on surfaces for a long time  It can cause a new rash even years later       · Bathe your pet:  Use warm water and shampoo on your pet's fur  This will prevent the spread of oil to your skin, car, and home  Wear long sleeves, long pants, and gloves while washing pets or any items that may have oil on them  · Reduce exposure to poison ivy:  Do not touch plants that look like poison ivy  Keep your yard free of poison ivy  While protecting your skin, remove the plant and the roots  Place them in a plastic bag and seal the bag tightly  · Do not burn poison ivy plants: This can spread the oil through the air  If you breathe the oil into your lungs, you could have swelling and serious breathing problems  Oil that clings to the fire [place] can land on your skin and cause a rash  Contact your healthcare provider if:   · You have pus, soft yellow scabs, or tenderness on the rash  · The itching gets worse or keeps you awake at night  · The rash covers more than 1/4 of your skin or spreads to your eyes, mouth, or genital area  · The rash is not better after 2 to 3 weeks  · You have tender, swollen glands on the sides of your neck  · You have swelling in your arms and legs  · You have questions or concerns about your condition or care  Seek care immediately or call 911 if:   · You have a fever  · You have redness, swelling, and tenderness around the rash  · You have trouble breathing  © 2017 [employer] [employer] Information is for End User's use only and may not be sold, redistributed or otherwise used for commercial purposes  All illustrations and images included in CareNotes® are the copyrighted property of [employer]  or [employer]  The above information is an  only  It is not intended as medical advice for individual conditions or treatments  Talk to your doctor, nurse or pharmacist before following any medical regimen to see if it is safe and effective for you

## 2018-08-07 NOTE — ED PROVIDER NOTES
History  Chief Complaint   Patient presents with   [facility]     Patient states he was in the woods last week and was exposed to poison Ivy  Patient staes it was first on his arms, but now it is all over his body  Patient states he tried OTC medications with no positive results  Pt with c/o of "poison ivy" - began on arms  " now everywhere" Pt states gets poison frequently  Pt was exposed in woods 2 days ago Pt has tried cortisone cream and PO Benadryl with no help  We discussed prednisone due to pt DM  Pt has had before and uses sliding scale insulin  Pt has no rsp sx  No difficulty swallowing   PMH  DM, Chronic pain, CKD, HTN,Neuropathy        History provided by:  Patient  Rash   Location:  Full body  Quality: itchiness and weeping    Quality: not bruising, not draining and not painful    Progression:  Worsening  Context: plant contact    Context: not animal contact, not chemical exposure, not exposure to similar rash, not insect bite/sting, not medications and not sick contacts    Relieved by:  Nothing  Ineffective treatments:  Antihistamines and anti-itch cream  Associated symptoms: no abdominal pain, no diarrhea, no fever, no headaches, no hoarse voice, no induration, no joint pain, no myalgias, no nausea, no periorbital edema, no shortness of breath, no sore throat, no throat swelling, no tongue swelling, not vomiting and not wheezing        Prior to Admission Medications   Prescriptions Last Dose Informant Patient Reported? Taking?    ALPRAZolam (XANAX) 1 mg tablet   Yes No   Sig: Take by mouth daily at bedtime as needed for anxiety   NIFEdipine ER (ADALAT CC) 60 MG 24 hr tablet   Yes No   Sig: Take 60 mg by mouth 2 (two) times a day   aspirin 81 mg chewable tablet   Yes No   Sig: Chew 81 mg daily   chlorthalidone (HYGROTEN) 50 MG tablet   Yes No   Sig: Take 50 mg by mouth 2 (two) times a day   cholecalciferol (VITAMIN D3) 1,000 units tablet   Yes No   Sig: Take 1,000 Units by mouth 2 (two) times a day   cloNIDine (CATAPRES) 0 3 mg tablet   Yes No   Sig: Take 0 3 mg by mouth 3 (three) times a day   doxazosin (CARDURA) 2 mg tablet   Yes No   Sig: Take 2 mg by mouth daily at bedtime   ferrous sulfate 325 (65 Fe) mg tablet   Yes No   Sig: Take 325 mg by mouth 2 (two) times a day with meals     hydrALAZINE (APRESOLINE) 50 mg tablet   Yes No   Sig: Take 50 mg by mouth 3 (three) times a day   hydrOXYzine HCL (ATARAX) 25 mg tablet   No No   Sig: Take 1 tablet (25 mg total) by mouth every 6 (six) hours as needed for itching   insulin aspart (NovoLOG) 100 units/mL injection   Yes No   Sig: Inject under the skin 3 (three) times a day before meals Sliding scale and carb count    levocetirizine (XYZAL) 5 MG tablet   No No   Sig: Take 1 tablet (5 mg total) by mouth every evening for 10 days   losartan (COZAAR) 100 MG tablet   Yes No   Sig: Take 100 mg by mouth daily   lovastatin (ALTOPREV) 40 MG 24 hr tablet   Yes No   Sig: Take 40 mg by mouth daily at bedtime   metoprolol succinate (TOPROL-XL) 100 mg 24 hr tablet   Yes No   Sig: Take 100 mg by mouth every morning   metoprolol succinate (TOPROL-XL) 50 mg 24 hr tablet   Yes No   Sig: Take 50 mg by mouth every evening   oxyCODONE-acetaminophen (PERCOCET)  mg per tablet   Yes No   Sig: Take 1 tablet by mouth every 4 (four) hours as needed for moderate pain   predniSONE 10 mg tablet   No No   Simg daily x 4days, 50mg daily x4days, 40mg daily x4days, 30mg daily x4days, 20mg daily x4days, 10mg daily x 4days      Facility-Administered Medications: None       Past Medical History:   Diagnosis Date    Chronic kidney failure, stage 4 (severe) (HCC)     Chronic pain     Diabetes mellitus (HCC)     Hyperlipidemia     Hypertension     Neuropathy     Renal disorder     Sleep apnea        Past Surgical History:   Procedure Laterality Date    APPENDECTOMY      TONSILLECTOMY         History reviewed  No pertinent family history    I have reviewed and agree with the history as documented  Social History   Substance Use Topics    Smoking status: Current Every Day Smoker     Packs/day: 0 50     Types: Cigarettes    Smokeless tobacco: Never Used    Alcohol use No        Review of Systems   Constitutional: Negative for activity change, appetite change, chills, diaphoresis and fever  HENT: Negative  Negative for congestion, facial swelling, hoarse voice, mouth sores and sore throat  Eyes: Negative  Negative for discharge, redness and visual disturbance  Respiratory: Negative  Negative for cough, choking, chest tightness, shortness of breath and wheezing  Cardiovascular: Negative  Negative for chest pain and palpitations  Gastrointestinal: Negative  Negative for abdominal pain, diarrhea, nausea and vomiting  Genitourinary: Negative  Musculoskeletal: Negative  Negative for arthralgias, joint swelling, myalgias, neck pain and neck stiffness  Skin: Positive for rash  Negative for wound  Neurological: Negative  Negative for tremors, syncope, speech difficulty, light-headedness and headaches  Psychiatric/Behavioral: Negative  All other systems reviewed and are negative  Physical Exam  Physical Exam   Constitutional: He is oriented to person, place, and time  He appears well-developed and well-nourished  He is active and cooperative  Non-toxic appearance  He does not have a sickly appearance  He does not appear ill  No distress  HENT:   Head: Normocephalic and atraumatic  Mouth/Throat: Uvula is midline, oropharynx is clear and moist and mucous membranes are normal  Mucous membranes are not dry  No uvula swelling  No posterior oropharyngeal edema or posterior oropharyngeal erythema  Eyes: Conjunctivae and EOM are normal  Pupils are equal, round, and reactive to light  Neck: Normal range of motion and phonation normal  Neck supple  Cardiovascular: Normal rate, regular rhythm, intact distal pulses and normal pulses     No extrasystoles are present  Pulmonary/Chest: Effort normal  No stridor  No respiratory distress  He has no wheezes  He has no rhonchi  He has no rales  Abdominal: Soft  Bowel sounds are normal  There is no rigidity, no guarding and no CVA tenderness  Neurological: He is alert and oriented to person, place, and time  He has normal strength  No cranial nerve deficit  Skin: Skin is warm and dry  Rash noted  No petechiae noted  Rash is papular and vesicular  He is not diaphoretic  No cyanosis  Rash of bilateral arms and legs and face   Psychiatric: He has a normal mood and affect  His speech is normal and behavior is normal  Cognition and memory are normal    Vitals reviewed  Vital Signs  ED Triage Vitals [08/06/18 2051]   Temperature Pulse Respirations Blood Pressure SpO2   98 1 °F (36 7 °C) 67 16 161/71 98 %      Temp Source Heart Rate Source Patient Position - Orthostatic VS BP Location FiO2 (%)   Temporal Monitor Sitting Left arm --      Pain Score       No Pain           Vitals:    08/06/18 2051   BP: 161/71   Pulse: 67   Patient Position - Orthostatic VS: Sitting       Visual Acuity      ED Medications  Medications   predniSONE tablet 60 mg (60 mg Oral Given 8/6/18 2117)       Diagnostic Studies  Results Reviewed     None                 No orders to display              Procedures  Procedures       Phone Contacts  ED Phone Contact    ED Course                               MDM  CritCare Time    Disposition  Final diagnoses:   Poison ivy dermatitis     Time reflects when diagnosis was documented in both MDM as applicable and the Disposition within this note     Time User Action Codes Description Comment    8/6/2018  9:05 PM Maye Sacks Add [L23 7] Poison ivy dermatitis       ED Disposition     ED Disposition Condition Comment    Discharge  [address] discharge to home/self care      Condition at discharge: Good        Follow-up Information     Follow up With Specialties Details Why [address] [place], [address]  [address]  [address] [place] [id number]  [phone]            Discharge Medication List as of 8/6/2018  9:09 PM      START taking these medications    Details   ! ! predniSONE 20 mg tablet Take 2 tablets daily for 3 days then one tablet daily for 3 days, Normal       !! - Potential duplicate medications found  Please discuss with provider        CONTINUE these medications which have NOT CHANGED    Details   ALPRAZolam (XANAX) 1 mg tablet Take by mouth daily at bedtime as needed for anxiety, Historical Med      aspirin 81 mg chewable tablet Chew 81 mg daily, Historical Med      chlorthalidone (HYGROTEN) 50 MG tablet Take 50 mg by mouth 2 (two) times a day, Historical Med      cholecalciferol (VITAMIN D3) 1,000 units tablet Take 1,000 Units by mouth 2 (two) times a day, Historical Med      cloNIDine (CATAPRES) 0 3 mg tablet Take 0 3 mg by mouth 3 (three) times a day, Historical Med      doxazosin (CARDURA) 2 mg tablet Take 2 mg by mouth daily at bedtime, Historical Med      ferrous sulfate 325 (65 Fe) mg tablet Take 325 mg by mouth 2 (two) times a day with meals  , Historical Med      hydrALAZINE (APRESOLINE) 50 mg tablet Take 50 mg by mouth 3 (three) times a day, Historical Med      hydrOXYzine HCL (ATARAX) 25 mg tablet Take 1 tablet (25 mg total) by mouth every 6 (six) hours as needed for itching, Starting Sun 6/10/2018, Normal      insulin aspart (NovoLOG) 100 units/mL injection Inject under the skin 3 (three) times a day before meals Sliding scale and carb count , Historical Med      levocetirizine (XYZAL) 5 MG tablet Take 1 tablet (5 mg total) by mouth every evening for 10 days, Starting Sun 6/10/2018, Until Wed 6/20/2018, Normal      losartan (COZAAR) 100 MG tablet Take 100 mg by mouth daily, Historical Med      lovastatin (ALTOPREV) 40 MG 24 hr tablet Take 40 mg by mouth daily at bedtime, Historical Med      !! metoprolol succinate (TOPROL-XL) 100 mg 24 hr tablet Take 100 mg by mouth every morning, Starting Mon 12/11/2017, Historical Med      !! metoprolol succinate (TOPROL-XL) 50 mg 24 hr tablet Take 50 mg by mouth every evening, Starting Mon 12/11/2017, Historical Med      NIFEdipine ER (ADALAT CC) 60 MG 24 hr tablet Take 60 mg by mouth 2 (two) times a day, Historical Med      oxyCODONE-acetaminophen (PERCOCET)  mg per tablet Take 1 tablet by mouth every 4 (four) hours as needed for moderate pain, Historical Med      !! predniSONE 10 mg tablet 60mg daily x 4days, 50mg daily x4days, 40mg daily x4days, 30mg daily x4days, 20mg daily x4days, 10mg daily x 4days, Normal       !! - Potential duplicate medications found  Please discuss with provider  No discharge procedures on file      ED Provider  Electronically Signed by           Xuan Holloway DO  08/07/18 8640

## 2021-03-29 ENCOUNTER — RX ONLY (RX ONLY)
Age: 41
End: 2021-03-29

## 2021-03-29 ENCOUNTER — OPTICAL OFFICE (OUTPATIENT)
Dept: URBAN - NONMETROPOLITAN AREA CLINIC 5 | Facility: CLINIC | Age: 41
Setting detail: OPHTHALMOLOGY
End: 2021-03-29
Payer: COMMERCIAL

## 2021-03-29 ENCOUNTER — DOCTOR'S OFFICE (OUTPATIENT)
Dept: URBAN - NONMETROPOLITAN AREA CLINIC 2 | Facility: CLINIC | Age: 41
Setting detail: OPHTHALMOLOGY
End: 2021-03-29
Payer: COMMERCIAL

## 2021-03-29 DIAGNOSIS — H52.223: ICD-10-CM

## 2021-03-29 DIAGNOSIS — H52.4: ICD-10-CM

## 2021-03-29 DIAGNOSIS — E10.9: ICD-10-CM

## 2021-03-29 PROBLEM — H25.013 CORTICAL CATARACT; BOTH EYES: Status: ACTIVE | Noted: 2021-03-29

## 2021-03-29 PROCEDURE — V2784 LENS POLYCARB OR EQUAL: HCPCS | Performed by: OPTOMETRIST

## 2021-03-29 PROCEDURE — 92004 COMPRE OPH EXAM NEW PT 1/>: CPT | Performed by: OPTOMETRIST

## 2021-03-29 PROCEDURE — V2202 LENS SPHERE BIFOCAL 7.12-20.: HCPCS | Performed by: OPTOMETRIST

## 2021-03-29 PROCEDURE — V2781 PROGRESSIVE LENS PER LENS: HCPCS | Performed by: OPTOMETRIST

## 2021-03-29 PROCEDURE — V2020 VISION SVCS FRAMES PURCHASES: HCPCS | Performed by: OPTOMETRIST

## 2021-03-29 PROCEDURE — 92015 DETERMINE REFRACTIVE STATE: CPT | Performed by: OPTOMETRIST

## 2021-03-29 ASSESSMENT — AXIALLENGTH_DERIVED
OD_AL: 22.9953
OS_AL: 22.99
OS_AL: 23.0828
OD_AL: 22.7662

## 2021-03-29 ASSESSMENT — REFRACTION_CURRENTRX
OS_CYLINDER: -2.75
OD_VPRISM_DIRECTION: SV
OS_SPHERE: PL
OS_OVR_VA: 20/
OD_CYLINDER: -3.50
OD_AXIS: 029
OS_AXIS: 150
OD_OVR_VA: 20/
OD_SPHERE: +0.50
OS_VPRISM_DIRECTION: SV

## 2021-03-29 ASSESSMENT — SPHEQUIV_DERIVED
OD_SPHEQUIV: -1.375
OS_SPHEQUIV: -1.375
OS_SPHEQUIV: -1.125
OD_SPHEQUIV: -0.75

## 2021-03-29 ASSESSMENT — KERATOMETRY
OD_K2POWER_DIOPTERS: 48.00
OS_K2POWER_DIOPTERS: 48.00
OS_AXISANGLE_DEGREES: 072
OD_AXISANGLE_DEGREES: 017
OS_K1POWER_DIOPTERS: 44.75
OD_K1POWER_DIOPTERS: 45.25

## 2021-03-29 ASSESSMENT — REFRACTION_MANIFEST
OD_ADD: +1.25
OD_AXIS: 030
OS_VA2: 20/25
OS_SPHERE: +0.50
OU_VA: 20/25-2
OS_VA1: 20/25-2
OD_VA1: 20/25-2
OD_SPHERE: +1.00
OS_CYLINDER: -3.25
OS_ADD: +1.25
OD_CYLINDER: -3.50
OD_VA2: 20/25
OS_AXIS: 150

## 2021-03-29 ASSESSMENT — REFRACTION_AUTOREFRACTION
OS_SPHERE: +1.00
OD_SPHERE: +1.25
OS_CYLINDER: -4.75
OD_AXIS: 016
OS_AXIS: 161
OD_CYLINDER: -5.25

## 2021-03-29 ASSESSMENT — TONOMETRY
OS_IOP_MMHG: 17
OD_IOP_MMHG: 17

## 2021-03-29 ASSESSMENT — CONFRONTATIONAL VISUAL FIELD TEST (CVF)
OD_FINDINGS: FULL
OS_FINDINGS: FULL

## 2021-03-29 ASSESSMENT — VISUAL ACUITY
OS_BCVA: 20/30-2
OD_BCVA: 20/40-1

## 2023-03-17 ENCOUNTER — OFFICE VISIT (OUTPATIENT)
Dept: URGENT CARE | Facility: CLINIC | Age: 43
End: 2023-03-17

## 2023-03-17 VITALS
WEIGHT: 258.4 LBS | HEART RATE: 70 BPM | DIASTOLIC BLOOD PRESSURE: 72 MMHG | SYSTOLIC BLOOD PRESSURE: 120 MMHG | TEMPERATURE: 98.8 F | BODY MASS INDEX: 34.09 KG/M2 | RESPIRATION RATE: 20 BRPM | OXYGEN SATURATION: 97 %

## 2023-03-17 DIAGNOSIS — T14.8XXA WOUND INFECTION: Primary | ICD-10-CM

## 2023-03-17 DIAGNOSIS — L08.9 WOUND INFECTION: Primary | ICD-10-CM

## 2023-03-17 NOTE — PROGRESS NOTES
[address]        NAME: Dang Call is a 38 y o  male  : 1980    MRN: [id number]  DATE: 2023  TIME: 5:22 PM    Assessment and Plan   Wound infection [T14  8XXA, L08 9]  1  Wound infection  mupirocin (BACTROBAN) 2 % ointment    Wound culture and Gram stain            Patient Instructions       Follow up with PCP in 3-5 days  Proceed to  ER if symptoms worsen  Chief Complaint     Chief Complaint   Patient presents with   • infection right calf     X 2 weeks         History of Present Illness       This is a 37yo male with a complex medical history  About 2 weeks ago he scratched his left posterior calf  He has been keeping it clean and putting OTC antibiotic ointment on it regularly 4-5x/day  Spot on his leg has been about the same size, maybe slightly larger than 4 days ago  The patient continues to be on a kidney transplant list   While overall he is doing well he continues to have ongoing chronic kidney disease  Given this information and his current presenting condition we discussed options of topical antibiotics prescription strength versus over-the-counter neosporin and avoiding oral antibiotics at this time  The patient and his significant other were appreciative of this and agreed with the plan of care  I have obtained a wound culture, and have directed the patient that should the area get any larger, he start with fevers, or he has any change in symptoms/wound presentation that he is to be re-evaluated right away  Review of Systems   Review of Systems   Constitutional: Negative for chills and fever  HENT: Negative for ear pain and sore throat  Eyes: Negative for pain and visual disturbance  Respiratory: Negative for cough and shortness of breath  Cardiovascular: Negative for chest pain and palpitations  Gastrointestinal: Negative for abdominal pain and vomiting  Genitourinary: Negative for dysuria and hematuria     Musculoskeletal: Negative for arthralgias and back pain  Skin: Positive for wound  Negative for color change and rash  Neurological: Negative for seizures and syncope  All other systems reviewed and are negative          Current Medications       Current Outpatient Medications:   •  aspirin 81 mg chewable tablet, Chew 81 mg daily, Disp: , Rfl:   •  chlorthalidone (HYGROTEN) 50 MG tablet, Take 50 mg by mouth 2 (two) times a day, Disp: , Rfl:   •  cholecalciferol (VITAMIN D3) 1,000 units tablet, Take 1,000 Units by mouth 2 (two) times a day, Disp: , Rfl:   •  cloNIDine (CATAPRES) 0 3 mg tablet, Take 0 3 mg by mouth 3 (three) times a day, Disp: , Rfl:   •  doxazosin (CARDURA) 2 mg tablet, Take 2 mg by mouth daily at bedtime, Disp: , Rfl:   •  ferrous sulfate 325 (65 Fe) mg tablet, Take 325 mg by mouth 2 (two) times a day with meals  , Disp: , Rfl:   •  hydrALAZINE (APRESOLINE) 50 mg tablet, Take 50 mg by mouth 3 (three) times a day, Disp: , Rfl:   •  hydrOXYzine HCL (ATARAX) 25 mg tablet, Take 1 tablet (25 mg total) by mouth every 6 (six) hours as needed for itching, Disp: 20 tablet, Rfl: 0  •  insulin aspart (NovoLOG) 100 units/mL injection, Inject under the skin 3 (three) times a day before meals Sliding scale and carb count , Disp: , Rfl:   •  losartan (COZAAR) 100 MG tablet, Take 100 mg by mouth daily, Disp: , Rfl:   •  lovastatin (ALTOPREV) 40 MG 24 hr tablet, Take 40 mg by mouth daily at bedtime, Disp: , Rfl:   •  metoprolol succinate (TOPROL-XL) 100 mg 24 hr tablet, Take 100 mg by mouth every morning, Disp: , Rfl:   •  metoprolol succinate (TOPROL-XL) 50 mg 24 hr tablet, Take 50 mg by mouth every evening, Disp: , Rfl:   •  mupirocin (BACTROBAN) 2 % ointment, Apply topically 3 (three) times a day, Disp: 22 g, Rfl: 0  •  NIFEdipine ER (ADALAT CC) 60 MG 24 hr tablet, Take 60 mg by mouth 2 (two) times a day, Disp: , Rfl:   •  predniSONE 10 mg tablet, 60mg daily x 4days, 50mg daily x4days, 40mg daily x4days, 30mg daily x4days, 20mg daily x4days, 10mg daily x 4days, Disp: 84 tablet, Rfl: 0  •  predniSONE 20 mg tablet, Take 2 tablets daily for 3 days then one tablet daily for 3 days, Disp: 10 tablet, Rfl: 0  •  ALPRAZolam (XANAX) 1 mg tablet, Take by mouth daily at bedtime as needed for anxiety (Patient not taking: Reported on 3/17/2023), Disp: , Rfl:   •  levocetirizine (XYZAL) 5 MG tablet, Take 1 tablet (5 mg total) by mouth every evening for 10 days, Disp: 10 tablet, Rfl: 0  •  oxyCODONE-acetaminophen (PERCOCET)  mg per tablet, Take 1 tablet by mouth every 4 (four) hours as needed for moderate pain (Patient not taking: Reported on 3/17/2023), Disp: , Rfl:     Current Allergies     Allergies as of 03/17/2023 - Reviewed 03/17/2023   Allergen Reaction Noted   • Spironolactone Myalgia 12/21/2016            The following portions of the patient's history were reviewed and updated as appropriate: allergies, current medications, past family history, past medical history, past social history, past surgical history and problem list      Past Medical History:   Diagnosis Date   • Chronic kidney failure, stage 4 (severe) (HCC)    • Chronic pain    • Diabetes mellitus ([address] )    • Hyperlipidemia    • Hypertension    • Neuropathy    • Renal disorder    • Sleep apnea        Past Surgical History:   Procedure Laterality Date   • APPENDECTOMY     • TONSILLECTOMY         History reviewed  No pertinent family history  Medications have been verified  Objective   /72   Pulse 70   Temp 98 8 °F (37 1 °C)   Resp 20   Wt 117 kg (258 lb 6 4 oz)   SpO2 97%   BMI 34 09 kg/m²        Physical Exam     Physical Exam  Vitals and nursing note reviewed  Constitutional:       General: He is not in acute distress  Appearance: Normal appearance  He is normal weight  He is not ill-appearing  HENT:      Head: Normocephalic and atraumatic        Right Ear: Tympanic membrane, ear canal and external ear normal       Left Ear: Tympanic membrane, ear canal and external ear normal       Nose: Nose normal  No congestion or rhinorrhea  Mouth/Throat:      Mouth: Mucous membranes are moist       Pharynx: Oropharynx is clear  No oropharyngeal exudate or posterior oropharyngeal erythema  Eyes:      Extraocular Movements: Extraocular movements intact  Conjunctiva/sclera: Conjunctivae normal       Pupils: Pupils are equal, round, and reactive to light  Cardiovascular:      Rate and Rhythm: Normal rate and regular rhythm  Pulses: Normal pulses  Heart sounds: Normal heart sounds  No murmur heard  Pulmonary:      Effort: Pulmonary effort is normal       Breath sounds: Normal breath sounds  Abdominal:      General: Abdomen is flat  Bowel sounds are normal       Palpations: Abdomen is soft  Musculoskeletal:         General: Normal range of motion  Cervical back: Normal range of motion and neck supple  Skin:     General: Skin is warm and dry  Capillary Refill: Capillary refill takes less than 2 seconds  Neurological:      General: No focal deficit present  Mental Status: He is alert and oriented to person, place, and time     Psychiatric:         Mood and Affect: Mood normal          Behavior: Behavior normal

## 2023-03-18 ENCOUNTER — TELEPHONE (OUTPATIENT)
Dept: URGENT CARE | Facility: MEDICAL CENTER | Age: 43
End: 2023-03-18

## 2023-03-18 NOTE — TELEPHONE ENCOUNTER
Wound culture positive for Group A Streptococcus  Prescribed Bactroban at the time of visit  Unable to leave message on voicemail as mailbox is full  Will try again later

## 2023-03-19 LAB
BACTERIA WND AEROBE CULT: ABNORMAL
BACTERIA WND AEROBE CULT: ABNORMAL
GRAM STN SPEC: ABNORMAL
GRAM STN SPEC: ABNORMAL

## 2023-03-20 ENCOUNTER — TELEPHONE (OUTPATIENT)
Dept: URGENT CARE | Facility: CLINIC | Age: 43
End: 2023-03-20

## 2023-03-20 NOTE — TELEPHONE ENCOUNTER
Spoke with wife and patient regarding positive culture of wound  Patient and wife reports the area is not gotten any worse, has been about the same, but does report it is starting to be dry/scaly in the middle  This is where the culture was taken where it was previously draining  Would like to remain using the topical abx to try and avoid oral/systemic abx treatment (due to kidney disease) at this time  Will give it a few days and if no improvement, will be re-evaluated/consider oral antibiotics at that time  Continues to not have any other systemic symptoms

## 2023-04-05 ENCOUNTER — APPOINTMENT (EMERGENCY)
Dept: RADIOLOGY | Facility: HOSPITAL | Age: 43
End: 2023-04-05

## 2023-04-05 ENCOUNTER — HOSPITAL ENCOUNTER (EMERGENCY)
Facility: HOSPITAL | Age: 43
Discharge: HOME | End: 2023-04-05
Attending: STUDENT IN AN ORGANIZED HEALTH CARE EDUCATION/TRAINING PROGRAM

## 2023-04-05 VITALS
RESPIRATION RATE: 17 BRPM | TEMPERATURE: 97.8 F | DIASTOLIC BLOOD PRESSURE: 72 MMHG | SYSTOLIC BLOOD PRESSURE: 157 MMHG | HEART RATE: 61 BPM | OXYGEN SATURATION: 99 %

## 2023-04-05 DIAGNOSIS — M79.672 LEFT FOOT PAIN: Primary | ICD-10-CM

## 2023-04-05 NOTE — ED PROVIDER NOTES
History  Chief Complaint   Patient presents with   • Foot Pain     Patient states he dropped a hitch on his left foot this morning  Patient has some swelling and bruising  Pain 8/10 at this time     HPI: This a 26-year-old male presents the emergency department after dropping a large trailer hitch on his left foot  Patient states he was not originally bothered by continued to work throughout the day however when he got home states the pain was unbearable and was having trouble moving his left ankle secondary to pain and swelling  Has come to the emergency department for evaluation  Although he has decreased range of motion of the left ankle he is able to ambulate without assistance  He denies any other associated concerns  Prior to Admission Medications   Prescriptions Last Dose Informant Patient Reported? Taking?    ALPRAZolam (XANAX) 1 mg tablet   Yes No   Sig: Take by mouth daily at bedtime as needed for anxiety   Patient not taking: Reported on 3/17/2023   NIFEdipine ER (ADALAT CC) 60 MG 24 hr tablet   Yes No   Sig: Take 60 mg by mouth 2 (two) times a day   aspirin 81 mg chewable tablet   Yes No   Sig: Chew 81 mg daily   chlorthalidone (HYGROTEN) 50 MG tablet   Yes No   Sig: Take 50 mg by mouth 2 (two) times a day   cholecalciferol (VITAMIN D3) 1,000 units tablet   Yes No   Sig: Take 1,000 Units by mouth 2 (two) times a day   cloNIDine (CATAPRES) 0 3 mg tablet   Yes No   Sig: Take 0 3 mg by mouth 3 (three) times a day   doxazosin (CARDURA) 2 mg tablet   Yes No   Sig: Take 2 mg by mouth daily at bedtime   ferrous sulfate 325 (65 Fe) mg tablet   Yes No   Sig: Take 325 mg by mouth 2 (two) times a day with meals     hydrALAZINE (APRESOLINE) 50 mg tablet   Yes No   Sig: Take 50 mg by mouth 3 (three) times a day   hydrOXYzine HCL (ATARAX) 25 mg tablet   No No   Sig: Take 1 tablet (25 mg total) by mouth every 6 (six) hours as needed for itching   insulin aspart (NovoLOG) 100 units/mL injection   Yes No   Sig: Inject under the skin 3 (three) times a day before meals Sliding scale and carb count    levocetirizine (XYZAL) 5 MG tablet   No No   Sig: Take 1 tablet (5 mg total) by mouth every evening for 10 days   losartan (COZAAR) 100 MG tablet   Yes No   Sig: Take 100 mg by mouth daily   lovastatin (ALTOPREV) 40 MG 24 hr tablet   Yes No   Sig: Take 40 mg by mouth daily at bedtime   metoprolol succinate (TOPROL-XL) 100 mg 24 hr tablet   Yes No   Sig: Take 100 mg by mouth every morning   metoprolol succinate (TOPROL-XL) 50 mg 24 hr tablet   Yes No   Sig: Take 50 mg by mouth every evening   mupirocin (BACTROBAN) 2 % ointment   No No   Sig: Apply topically 3 (three) times a day   oxyCODONE-acetaminophen (PERCOCET)  mg per tablet   Yes No   Sig: Take 1 tablet by mouth every 4 (four) hours as needed for moderate pain   Patient not taking: Reported on 3/17/2023   predniSONE 10 mg tablet   No No   Simg daily x 4days, 50mg daily x4days, 40mg daily x4days, 30mg daily x4days, 20mg daily x4days, 10mg daily x 4days   predniSONE 20 mg tablet   No No   Sig: Take 2 tablets daily for 3 days then one tablet daily for 3 days      Facility-Administered Medications: None       Past Medical History:   Diagnosis Date   • Chronic kidney failure, stage 4 (severe) (HCC)    • Chronic pain    • Diabetes mellitus ([facility] [address] )    • Hyperlipidemia    • Hypertension    • Neuropathy    • Renal disorder    • Sleep apnea        Past Surgical History:   Procedure Laterality Date   • APPENDECTOMY     • TONSILLECTOMY         History reviewed  No pertinent family history  I have reviewed and agree with the history as documented      E-Cigarette/Vaping   • E-Cigarette Use Former User      E-Cigarette/Vaping Substances     Social History     Tobacco Use   • Smoking status: Every Day     Packs/day: 0 25     Types: Cigarettes   • Smokeless tobacco: Never   Vaping Use   • Vaping Use: Former   Substance Use Topics   • Alcohol use: No   • Drug use: No       Review of Systems   Constitutional: Negative for chills and fever  HENT: Negative for ear pain and sore throat  Eyes: Negative for pain and visual disturbance  Respiratory: Negative for cough and shortness of breath  Cardiovascular: Negative for chest pain and palpitations  Gastrointestinal: Negative for abdominal pain and vomiting  Genitourinary: Negative for dysuria and hematuria  Musculoskeletal: Negative for arthralgias and back pain  Left ankle pain   Skin: Negative for color change and rash  Neurological: Negative for seizures and syncope  All other systems reviewed and are negative  Physical Exam  Physical Exam  Constitutional:       Appearance: He is well-developed  HENT:      Head: Normocephalic  Eyes:      Pupils: Pupils are equal, round, and reactive to light  Cardiovascular:      Rate and Rhythm: Normal rate and regular rhythm  Pulmonary:      Effort: Pulmonary effort is normal       Breath sounds: Normal breath sounds  Abdominal:      General: Bowel sounds are normal       Palpations: Abdomen is soft  Musculoskeletal:         General: Normal range of motion  Cervical back: Normal range of motion and neck supple  Comments: Left ankle: Creased range of motion secondary to pain, swelling  No obvious deformity or overlying skin tenderness  Left foot: Moderate ecchymotic lesion at the base of the fourth metatarsal, point tenderness above the dorsal aspect of the third-4-fifth metatarsal   Good capillary refill sensation motor function in all 5 toes  Skin:     General: Skin is warm           Vital Signs  ED Triage Vitals [04/05/23 1903]   Temperature Pulse Respirations Blood Pressure SpO2   97 8 °F (36 6 °C) 61 17 157/72 99 %      Temp Source Heart Rate Source Patient Position - Orthostatic VS BP Location FiO2 (%)   Temporal Monitor -- -- --      Pain Score       8           Vitals:    04/05/23 1903   BP: 157/72   Pulse: 61         Visual Acuity      ED Medications  Medications - No data to display    Diagnostic Studies  Results Reviewed     None                 XR foot 3+ views LEFT    (Results Pending)              Procedures  Procedures         ED Course  ED Course as of 04/05/23 1918 Wed Apr 05, 2023 1918 No acute fracture or acute pathology noted on x-rays interpreted myself  SBIRT 22yo+    Flowsheet Row Most Recent Value   SBIRT (25 yo +)    In order to provide better care to our patients, we are screening all of our patients for alcohol and drug use  Would it be okay to ask you these screening questions? No Filed at: 04/05/2023 1907                    Medical Decision Making  69-year-old male presents emergency department with isolated injury of the left foot  History and physical exam finding concerning for osseous injury of the third fourth fifth metatarsal and several of the other foot bones  Suspect there may be underlying osseous injury we will proceed with x-ray evaluation  Anticipate splinting and discharged home with potential orthopedic follow-up  Amount and/or Complexity of Data Reviewed  External Data Reviewed: radiology  Radiology: ordered  Disposition  Final diagnoses:   Left foot pain     Time reflects when diagnosis was documented in both MDM as applicable and the Disposition within this note     Time User Action Codes Description Comment    4/5/2023  7:18 PM Laisha Sauceda Add [L78 801] Left foot pain       ED Disposition     ED Disposition   Discharge    Condition   Stable    Date/Time   Wed Apr 5, 2023  7:18 PM    [id number] [facility] discharge to home/self care  Follow-up Information     Follow up With Specialties Details Why [address] [place] [address] Family Medicine   [address] [place] [id number]  [phone]            Patient's Medications   Discharge Prescriptions    No medications on file       No discharge procedures on file      PDMP Review None          ED Provider  Electronically Signed by           Andry Garza MD  04/05/23 0071

## 2023-04-17 ENCOUNTER — DOCTOR'S OFFICE (OUTPATIENT)
Dept: URBAN - NONMETROPOLITAN AREA CLINIC 2 | Facility: CLINIC | Age: 43
Setting detail: OPHTHALMOLOGY
End: 2023-04-17
Payer: COMMERCIAL

## 2023-04-17 DIAGNOSIS — H52.03: ICD-10-CM

## 2023-04-17 DIAGNOSIS — H52.4: ICD-10-CM

## 2023-04-17 DIAGNOSIS — H52.223: ICD-10-CM

## 2023-04-17 DIAGNOSIS — E10.3293: ICD-10-CM

## 2023-04-17 PROCEDURE — 92250 FUNDUS PHOTOGRAPHY W/I&R: CPT

## 2023-04-17 PROCEDURE — 92014 COMPRE OPH EXAM EST PT 1/>: CPT

## 2023-04-17 PROCEDURE — 92015 DETERMINE REFRACTIVE STATE: CPT

## 2023-04-17 ASSESSMENT — REFRACTION_AUTOREFRACTION
OD_AXIS: 015
OS_SPHERE: +1.25
OS_AXIS: 157
OD_CYLINDER: -6.50
OD_SPHERE: +0.75
OS_CYLINDER: -4.50

## 2023-04-17 ASSESSMENT — REFRACTION_CURRENTRX
OD_SPHERE: +1.00
OS_SPHERE: +0.50
OS_AXIS: 149
OD_AXIS: 034
OS_VPRISM_DIRECTION: PROGS
OD_ADD: +1.25
OD_CYLINDER: -3.50
OS_CYLINDER: -3.25
OD_VPRISM_DIRECTION: PROGS
OD_OVR_VA: 20/
OS_OVR_VA: 20/
OS_ADD: +1.25

## 2023-04-17 ASSESSMENT — KERATOMETRY
OS_K1POWER_DIOPTERS: 44.75
OD_K1POWER_DIOPTERS: 45.25
OD_K2POWER_DIOPTERS: 48.00
OS_K2POWER_DIOPTERS: 48.00
OD_AXISANGLE_DEGREES: 017
OS_AXISANGLE_DEGREES: 072

## 2023-04-17 ASSESSMENT — REFRACTION_MANIFEST
OS_ADD: +1.50
OD_AXIS: 025
OD_VA2: 20/25
OS_CYLINDER: -3.25
OS_VA1: 20/30+2
OU_VA: 20/25-2
OS_SPHERE: +0.50
OS_VA2: 20/25
OD_CYLINDER: -3.50
OS_AXIS: 150
OD_ADD: +1.50
OD_SPHERE: +1.00
OD_VA1: 20/25-2

## 2023-04-17 ASSESSMENT — VISUAL ACUITY
OS_BCVA: 20/30-2
OD_BCVA: 20/30-2

## 2023-04-17 ASSESSMENT — SPHEQUIV_DERIVED
OS_SPHEQUIV: -1.125
OS_SPHEQUIV: -1
OD_SPHEQUIV: -2.5
OD_SPHEQUIV: -0.75

## 2023-04-17 ASSESSMENT — CONFRONTATIONAL VISUAL FIELD TEST (CVF)
OD_FINDINGS: FULL
OS_FINDINGS: FULL

## 2023-04-17 ASSESSMENT — TONOMETRY
OS_IOP_MMHG: 16
OD_IOP_MMHG: 16

## 2023-04-17 ASSESSMENT — AXIALLENGTH_DERIVED
OD_AL: 22.7662
OS_AL: 22.9438
OD_AL: 23.4193
OS_AL: 22.99

## 2023-10-13 ENCOUNTER — OFFICE VISIT (OUTPATIENT)
Dept: URGENT CARE | Facility: CLINIC | Age: 43
End: 2023-10-13
Payer: COMMERCIAL

## 2023-10-13 VITALS
RESPIRATION RATE: 20 BRPM | HEART RATE: 61 BPM | BODY MASS INDEX: 33.56 KG/M2 | SYSTOLIC BLOOD PRESSURE: 134 MMHG | DIASTOLIC BLOOD PRESSURE: 67 MMHG | TEMPERATURE: 98.2 F | WEIGHT: 254.4 LBS | OXYGEN SATURATION: 98 %

## 2023-10-13 DIAGNOSIS — L25.5 CONTACT DERMATITIS DUE TO PLANTS, EXCEPT FOOD, UNSPECIFIED CONTACT DERMATITIS TYPE: Primary | ICD-10-CM

## 2023-10-13 PROBLEM — N18.4 TYPE 1 DIABETES MELLITUS WITH STAGE 4 CHRONIC KIDNEY DISEASE (HCC): Chronic | Status: ACTIVE | Noted: 2018-08-14

## 2023-10-13 PROBLEM — E10.22 TYPE 1 DIABETES MELLITUS WITH STAGE 4 CHRONIC KIDNEY DISEASE (HCC): Chronic | Status: ACTIVE | Noted: 2018-08-14

## 2023-10-13 PROBLEM — F17.200 TOBACCO USE DISORDER: Status: ACTIVE | Noted: 2017-08-01

## 2023-10-13 PROCEDURE — 99213 OFFICE O/P EST LOW 20 MIN: CPT | Performed by: PHYSICIAN ASSISTANT

## 2023-10-13 PROCEDURE — S9088 SERVICES PROVIDED IN URGENT: HCPCS | Performed by: PHYSICIAN ASSISTANT

## 2023-10-13 RX ORDER — BETAMETHASONE DIPROPIONATE 0.05 %
GEL (GRAM) TOPICAL 2 TIMES DAILY
Qty: 50 G | Refills: 1 | Status: SHIPPED | OUTPATIENT
Start: 2023-10-13

## 2023-10-13 NOTE — PROGRESS NOTES
[facility]        NAME: Danielle Sherman is a 37 y.o. male  : 1980    MRN: [id number]  DATE: 2023  TIME: 9:05 AM    Assessment and Plan   Contact dermatitis due to plants, except food, unspecified contact dermatitis type [L25.5]  1. Contact dermatitis due to plants, except food, unspecified contact dermatitis type  betamethasone, augmented, (DIPROLENE) 0.05 % gel            Patient Instructions     Patient Instructions   Poison Ivy   WHAT YOU NEED TO KNOW:   Poison ivy is a plant that can cause an itchy, uncomfortable rash on your skin. Poison ivy grows as a shrub or vine in woods, fields, and areas of thick [place]. It has 3 bright green leaves on each stem that turn red in . DISCHARGE INSTRUCTIONS:   Medicines:   Antiseptic or drying creams or ointments: These medicines may be used to dry out the rash and decrease the itching. These products may be available without a doctor's order. Steroids: This medicine helps decrease itching and inflammation. It can be given as a cream to apply to your skin or as a pill. Antihistamines: This medicine may help decrease itching and help you sleep. It is available without a doctor's order. Take your medicine as directed. Contact your healthcare provider if you think your medicine is not helping or if you have side effects. Tell your provider if you are allergic to any medicine. Keep a list of the medicines, vitamins, and herbs you take. Include the amounts, and when and why you take them. Bring the list or the pill bottles to follow-up visits. Carry your medicine list with you in case of an emergency. Follow up with your doctor as directed:  Write down your questions so you remember to ask them during your visits. How your poison ivy rash spreads: You cannot spread poison ivy by touching your rash or the liquid from your blisters. Poison ivy is spread only if you scratch your skin while it still has oil on it.  You may think your rash is spreading because new rashes appear over a number of days. This happens because areas covered by thin skin break out in a rash first. Your face or forearms may develop a rash before thicker areas, such as the palms of your hands. Self-care:   Keep your rash clean and dry:  Wash it with soap and water. Gently pat it dry with a clean towel. Try not to scratch or rub your rash: This can cause your skin to become infected. Use a compress on your rash:  Dip a clean washcloth in cool water. Wring it out and place it on your rash. Leave the washcloth on your skin for 15 minutes. Do this at least 3 times per day. Take a cornstarch or oatmeal bath: If your rash is too large to cover with wet washcloths, take 3 or 4 cornstarch baths daily. Mix 1 pound of cornstarch with a little water to make a paste. Add the paste to a tub full of water and mix well. You may also use colloidal oatmeal in the bath water. Use lukewarm water. Avoid hot water because it may cause your itching to increase. Prevent a poison ivy rash in the future:   Wear skin protection:  Wear long pants, a long-sleeved shirt, and gloves. Use a skin block lotion to protect your skin from poison ivy oil. You can find this at a drugstore without a prescription. Wash clothing after possible exposure: If you think you have been near a poison ivy plant, wash the clothes you were wearing separately from other clothes. Rinse the washing machine well after you take the clothes out. Scrub boots and shoes with warm, soapy water. Dry clean items and clothing that you cannot wash in water. Poison ivy oil is sticky and can stay on surfaces for a long time. It can cause a new rash even years later. Bathe your pet:  Use warm water and shampoo on your pet's fur. This will prevent the spread of oil to your skin, car, and home. Wear long sleeves, long pants, and gloves while washing pets or any items that may have oil on them.     Reduce exposure to poison ivy:  Do not touch plants that look like poison ivy. Keep your yard free of poison ivy. While protecting your skin, remove the plant and the roots. Place them in a plastic bag and seal the bag tightly. Do not burn poison ivy plants: This can spread the oil through the air. If you breathe the oil into your lungs, you could have swelling and serious breathing problems. Oil that clings to the fire [place]ia can land on your skin and cause a rash. Contact your healthcare provider if:   You have pus, soft yellow scabs, or tenderness on the rash. The itching gets worse or keeps you awake at night. The rash covers more than 1/4 of your skin or spreads to your eyes, mouth, or genital area. The rash is not better after 2 to 3 weeks. You have tender, swollen glands on the sides of your neck. You have swelling in your arms and legs. You have questions or concerns about your condition or care. Return to the emergency department if:   You have a fever. You have redness, swelling, and tenderness around the rash. You have trouble breathing. © Copyright [facility] 2023 Information is for End User's use only and may not be sold, redistributed or otherwise used for commercial purposes. The above information is an  only. It is not intended as medical advice for individual conditions or treatments. Talk to your doctor, nurse or pharmacist before following any medical regimen to see if it is safe and effective for you. Follow up with PCP in 3-5 days. Proceed to  ER if symptoms worsen. Chief Complaint     Chief Complaint   Patient presents with    possible poison ivy to arms     Using Hydocortisone           History of Present Illness       Patient presents the clinic complaining of a rash on his arms. He states that he was working outside and is started about poison ivy.   He has been using over-the-counter cortisone and proximal disease which is prescribed by his primary care doctor. He does have a history of type 1 diabetes. Review of Systems   Review of Systems   Constitutional:  Negative for chills and fatigue. Respiratory:  Negative for cough and shortness of breath. Skin:  Positive for rash.          Current Medications       Current Outpatient Medications:     aspirin 81 mg chewable tablet, Chew 81 mg daily, Disp: , Rfl:     betamethasone, augmented, (DIPROLENE) 0.05 % gel, Apply topically 2 (two) times a day, Disp: 50 g, Rfl: 1    chlorthalidone (HYGROTEN) 50 MG tablet, Take 50 mg by mouth 2 (two) times a day, Disp: , Rfl:     cholecalciferol (VITAMIN D3) 1,000 units tablet, Take 1,000 Units by mouth 2 (two) times a day, Disp: , Rfl:     cloNIDine (CATAPRES) 0.3 mg tablet, Take 0.3 mg by mouth 3 (three) times a day, Disp: , Rfl:     doxazosin (CARDURA) 2 mg tablet, Take 2 mg by mouth daily at bedtime, Disp: , Rfl:     ferrous sulfate 325 (65 Fe) mg tablet, Take 325 mg by mouth 2 (two) times a day with meals  , Disp: , Rfl:     hydrALAZINE (APRESOLINE) 50 mg tablet, Take 50 mg by mouth 3 (three) times a day, Disp: , Rfl:     hydrOXYzine HCL (ATARAX) 25 mg tablet, Take 1 tablet (25 mg total) by mouth every 6 (six) hours as needed for itching, Disp: 20 tablet, Rfl: 0    insulin aspart (NovoLOG) 100 units/mL injection, Inject under the skin 3 (three) times a day before meals Sliding scale and carb count , Disp: , Rfl:     losartan (COZAAR) 100 MG tablet, Take 100 mg by mouth daily, Disp: , Rfl:     lovastatin (ALTOPREV) 40 MG 24 hr tablet, Take 40 mg by mouth daily at bedtime, Disp: , Rfl:     metoprolol succinate (TOPROL-XL) 100 mg 24 hr tablet, Take 100 mg by mouth every morning, Disp: , Rfl:     metoprolol succinate (TOPROL-XL) 50 mg 24 hr tablet, Take 50 mg by mouth every evening, Disp: , Rfl:     mupirocin (BACTROBAN) 2 % ointment, Apply topically 3 (three) times a day, Disp: 22 g, Rfl: 0    NIFEdipine ER (ADALAT CC) 60 MG 24 hr tablet, Take 60 mg by mouth 2 (two) times a day, Disp: , Rfl:     predniSONE 10 mg tablet, 60mg daily x 4days, 50mg daily x4days, 40mg daily x4days, 30mg daily x4days, 20mg daily x4days, 10mg daily x 4days, Disp: 84 tablet, Rfl: 0    predniSONE 20 mg tablet, Take 2 tablets daily for 3 days then one tablet daily for 3 days, Disp: 10 tablet, Rfl: 0    ALPRAZolam (XANAX) 1 mg tablet, Take by mouth daily at bedtime as needed for anxiety (Patient not taking: Reported on 3/17/2023), Disp: , Rfl:     levocetirizine (XYZAL) 5 MG tablet, Take 1 tablet (5 mg total) by mouth every evening for 10 days, Disp: 10 tablet, Rfl: 0    oxyCODONE-acetaminophen (PERCOCET)  mg per tablet, Take 1 tablet by mouth every 4 (four) hours as needed for moderate pain (Patient not taking: Reported on 3/17/2023), Disp: , Rfl:     Current Allergies     Allergies as of 10/13/2023 - Reviewed 10/13/2023   Allergen Reaction Noted    Spironolactone Myalgia 12/21/2016            The following portions of the patient's history were reviewed and updated as appropriate: allergies, current medications, past family history, past medical history, past social history, past surgical history and problem list.     Past Medical History:   Diagnosis Date    Chronic kidney failure, stage 4 (severe) (HCC)     Chronic pain     Diabetes mellitus ([address])     Hyperlipidemia     Hypertension     Neuropathy     Renal disorder     Sleep apnea        Past Surgical History:   Procedure Laterality Date    APPENDECTOMY      TONSILLECTOMY         History reviewed. No pertinent family history. Medications have been verified. Objective   /67   Pulse 61   Temp 98.2 °F (36.8 °C)   Resp 20   Wt 115 kg (254 lb 6.4 oz)   SpO2 98%   BMI 33.56 kg/m²        Physical Exam     Physical Exam  Skin:     Findings: Rash present. Rash is macular. Comments: -Macular rash noted on the antecubital space of both arms which is consistent with contact dermatitis.

## 2023-10-13 NOTE — LETTER
October 13, 2023     Patient: Jazmin Jones   YOB: 1980   Date of Visit: 10/13/2023       To Whom it May Concern:  Deion/  Libby Garcia was seen in my clinic on 10/13/2023. He may return to work on 10/16/2023 . If you have any questions or concerns, please don't hesitate to call.          Sincerely,          Mohinder Brady PA-C        CC: No Recipients

## 2023-10-13 NOTE — PATIENT INSTRUCTIONS
Poison Ivy   WHAT YOU NEED TO KNOW:   Poison ivy is a plant that can cause an itchy, uncomfortable rash on your skin. Poison ivy grows as a shrub or vine in woods, fields, and areas of thick [place]. It has 3 bright green leaves on each stem that turn red in [place]. DISCHARGE INSTRUCTIONS:   Medicines:   Antiseptic or drying creams or ointments: These medicines may be used to dry out the rash and decrease the itching. These products may be available without a doctor's order. Steroids: This medicine helps decrease itching and inflammation. It can be given as a cream to apply to your skin or as a pill. Antihistamines: This medicine may help decrease itching and help you sleep. It is available without a doctor's order. Take your medicine as directed. Contact your healthcare provider if you think your medicine is not helping or if you have side effects. Tell your provider if you are allergic to any medicine. Keep a list of the medicines, vitamins, and herbs you take. Include the amounts, and when and why you take them. Bring the list or the pill bottles to follow-up visits. Carry your medicine list with you in case of an emergency. Follow up with your doctor as directed:  Write down your questions so you remember to ask them during your visits. How your poison ivy rash spreads: You cannot spread poison ivy by touching your rash or the liquid from your blisters. Poison ivy is spread only if you scratch your skin while it still has oil on it. You may think your rash is spreading because new rashes appear over a number of days. This happens because areas covered by thin skin break out in a rash first. Your face or forearms may develop a rash before thicker areas, such as the palms of your hands. Self-care:   Keep your rash clean and dry:  Wash it with soap and water. Gently pat it dry with a clean towel. Try not to scratch or rub your rash: This can cause your skin to become infected.     Use a compress on your rash:  Dip a clean washcloth in cool water. Wring it out and place it on your rash. Leave the washcloth on your skin for 15 minutes. Do this at least 3 times per day. Take a cornstarch or oatmeal bath: If your rash is too large to cover with wet washcloths, take 3 or 4 cornstarch baths daily. Mix 1 pound of cornstarch with a little water to make a paste. Add the paste to a tub full of water and mix well. You may also use colloidal oatmeal in the bath water. Use lukewarm water. Avoid hot water because it may cause your itching to increase. Prevent a poison ivy rash in the future:   Wear skin protection:  Wear long pants, a long-sleeved shirt, and gloves. Use a skin block lotion to protect your skin from poison ivy oil. You can find this at a drugstore without a prescription. Wash clothing after possible exposure: If you think you have been near a poison ivy plant, wash the clothes you were wearing separately from other clothes. Rinse the washing machine well after you take the clothes out. Scrub boots and shoes with warm, soapy water. Dry clean items and clothing that you cannot wash in water. Poison ivy oil is sticky and can stay on surfaces for a long time. It can cause a new rash even years later. Bathe your pet:  Use warm water and shampoo on your pet's fur. This will prevent the spread of oil to your skin, car, and home. Wear long sleeves, long pants, and gloves while washing pets or any items that may have oil on them. Reduce exposure to poison ivy:  Do not touch plants that look like poison ivy. Keep your yard free of poison ivy. While protecting your skin, remove the plant and the roots. Place them in a plastic bag and seal the bag tightly. Do not burn poison ivy plants: This can spread the oil through the air. If you breathe the oil into your lungs, you could have swelling and serious breathing problems.  Oil that clings to the fire [place]ia can land on your skin and cause a rash. Contact your healthcare provider if:   You have pus, soft yellow scabs, or tenderness on the rash. The itching gets worse or keeps you awake at night. The rash covers more than 1/4 of your skin or spreads to your eyes, mouth, or genital area. The rash is not better after 2 to 3 weeks. You have tender, swollen glands on the sides of your neck. You have swelling in your arms and legs. You have questions or concerns about your condition or care. Return to the emergency department if:   You have a fever. You have redness, swelling, and tenderness around the rash. You have trouble breathing. © Copyright [facility] 2023 Information is for End User's use only and may not be sold, redistributed or otherwise used for commercial purposes. The above information is an  only. It is not intended as medical advice for individual conditions or treatments. Talk to your doctor, nurse or pharmacist before following any medical regimen to see if it is safe and effective for you.

## 2023-11-29 ENCOUNTER — APPOINTMENT (OUTPATIENT)
Dept: RADIOLOGY | Facility: CLINIC | Age: 43
End: 2023-11-29
Payer: COMMERCIAL

## 2023-11-29 ENCOUNTER — OFFICE VISIT (OUTPATIENT)
Dept: URGENT CARE | Facility: CLINIC | Age: 43
End: 2023-11-29
Payer: COMMERCIAL

## 2023-11-29 VITALS — RESPIRATION RATE: 18 BRPM | TEMPERATURE: 97 F | OXYGEN SATURATION: 97 % | HEART RATE: 62 BPM

## 2023-11-29 DIAGNOSIS — S80.12XA CONTUSION OF MULTIPLE SITES OF LEFT LOWER EXTREMITY, INITIAL ENCOUNTER: Primary | ICD-10-CM

## 2023-11-29 DIAGNOSIS — M79.605 PAIN OF LEFT LOWER EXTREMITY: ICD-10-CM

## 2023-11-29 PROCEDURE — 99213 OFFICE O/P EST LOW 20 MIN: CPT

## 2023-11-29 PROCEDURE — 73590 X-RAY EXAM OF LOWER LEG: CPT

## 2023-11-29 PROCEDURE — S9088 SERVICES PROVIDED IN URGENT: HCPCS

## 2023-11-29 PROCEDURE — 73610 X-RAY EXAM OF ANKLE: CPT

## 2023-11-29 NOTE — PROGRESS NOTES
[facility]        NAME: Char Cooper is a 37 y.o. male  : 1980    MRN: [id number]  DATE: 2023  TIME: 5:03 PM    Assessment and Plan   Contusion of multiple sites of left lower extremity, initial encounter [S80.12XA]  1. Contusion of multiple sites of left lower extremity, initial encounter  XR tibia fibula 2 vw left    XR ankle 3+ vw left        No acute osseous abnormalities noted on x-rays. Symptoms are consistent with a contusion recommend supportive care such as rest, ice, elevation and Tylenol for pain. Patient Instructions   No fractures noted on xray. Will follow with official read from radiology. Apply ice for 20 minutes at a time every 2-3 hours   May take tylenol for pain  Elevate extremity     Follow up with PCP in 3-5 days. Proceed to  ER if symptoms worsen. Chief Complaint     Chief Complaint   Patient presents with    Leg Pain     Left          History of Present Illness       Patient is a 18-year-old male who presents to the office today for left leg injury. He states that he was walking down a slope by his house which [place] walkway but not really a walkway when he slipped and fell injuring his left ankle and leg. He is unsure of whether or not it hit the brakes next to the walkway. He notes that the pain is worse with ambulating. Denies any numbness or tingling. Review of Systems   Review of Systems   Musculoskeletal:  Positive for arthralgias and gait problem. Skin:  Positive for color change. All other systems reviewed and are negative.         Current Medications       Current Outpatient Medications:     aspirin 81 mg chewable tablet, Chew 81 mg daily, Disp: , Rfl:     chlorthalidone (HYGROTEN) 50 MG tablet, Take 50 mg by mouth 2 (two) times a day, Disp: , Rfl:     cholecalciferol (VITAMIN D3) 1,000 units tablet, Take 1,000 Units by mouth 2 (two) times a day, Disp: , Rfl:     cloNIDine (CATAPRES) 0.3 mg tablet, Take 0.3 mg by mouth 3 (three) times a day, Disp: , Rfl:     doxazosin (CARDURA) 2 mg tablet, Take 2 mg by mouth daily at bedtime, Disp: , Rfl:     ferrous sulfate 325 (65 Fe) mg tablet, Take 325 mg by mouth 2 (two) times a day with meals  , Disp: , Rfl:     hydrALAZINE (APRESOLINE) 50 mg tablet, Take 50 mg by mouth 3 (three) times a day, Disp: , Rfl:     hydrOXYzine HCL (ATARAX) 25 mg tablet, Take 1 tablet (25 mg total) by mouth every 6 (six) hours as needed for itching, Disp: 20 tablet, Rfl: 0    insulin aspart (NovoLOG) 100 units/mL injection, Inject under the skin 3 (three) times a day before meals Sliding scale and carb count , Disp: , Rfl:     losartan (COZAAR) 100 MG tablet, Take 100 mg by mouth daily, Disp: , Rfl:     lovastatin (ALTOPREV) 40 MG 24 hr tablet, Take 40 mg by mouth daily at bedtime, Disp: , Rfl:     metoprolol succinate (TOPROL-XL) 100 mg 24 hr tablet, Take 100 mg by mouth every morning, Disp: , Rfl:     metoprolol succinate (TOPROL-XL) 50 mg 24 hr tablet, Take 50 mg by mouth every evening, Disp: , Rfl:     mupirocin (BACTROBAN) 2 % ointment, Apply topically 3 (three) times a day, Disp: 22 g, Rfl: 0    NIFEdipine ER (ADALAT CC) 60 MG 24 hr tablet, Take 60 mg by mouth 2 (two) times a day, Disp: , Rfl:     ALPRAZolam (XANAX) 1 mg tablet, Take by mouth daily at bedtime as needed for anxiety (Patient not taking: Reported on 3/17/2023), Disp: , Rfl:     betamethasone, augmented, (DIPROLENE) 0.05 % gel, Apply topically 2 (two) times a day, Disp: 50 g, Rfl: 1    levocetirizine (XYZAL) 5 MG tablet, Take 1 tablet (5 mg total) by mouth every evening for 10 days, Disp: 10 tablet, Rfl: 0    oxyCODONE-acetaminophen (PERCOCET)  mg per tablet, Take 1 tablet by mouth every 4 (four) hours as needed for moderate pain (Patient not taking: Reported on 3/17/2023), Disp: , Rfl:     predniSONE 10 mg tablet, 60mg daily x 4days, 50mg daily x4days, 40mg daily x4days, 30mg daily x4days, 20mg daily x4days, 10mg daily x 4days (Patient not taking: Reported on 11/29/2023), Disp: 84 tablet, Rfl: 0    predniSONE 20 mg tablet, Take 2 tablets daily for 3 days then one tablet daily for 3 days (Patient not taking: Reported on 11/29/2023), Disp: 10 tablet, Rfl: 0    Current Allergies     Allergies as of 11/29/2023 - Reviewed 11/29/2023   Allergen Reaction Noted    Spironolactone Myalgia 12/21/2016            The following portions of the patient's history were reviewed and updated as appropriate: allergies, current medications, past family history, past medical history, past social history, past surgical history and problem list.     Past Medical History:   Diagnosis Date    Chronic kidney failure, stage 4 (severe) (HCC)     Chronic pain     Diabetes mellitus ([address])     Hyperlipidemia     Hypertension     Neuropathy     Renal disorder     Sleep apnea        Past Surgical History:   Procedure Laterality Date    APPENDECTOMY      TONSILLECTOMY         History reviewed. No pertinent family history. Medications have been verified. Objective   Pulse 62   Temp (!) 97 °F (36.1 °C)   Resp 18   SpO2 97%        Physical Exam     Physical Exam  Vitals and nursing note reviewed. Constitutional:       Appearance: Normal appearance. He is normal weight. Cardiovascular:      Rate and Rhythm: Normal rate. Pulses: Normal pulses. Pulmonary:      Effort: Pulmonary effort is normal.   Musculoskeletal:         General: Swelling and tenderness present. Left ankle: Swelling present. Tenderness present over the medial malleolus. Legs:       Comments: Pedal pulse +2. Cap refill less than 2 seconds. Skin:     General: Skin is warm. Capillary Refill: Capillary refill takes less than 2 seconds. Findings: Bruising present. Neurological:      Mental Status: He is alert.

## 2023-11-29 NOTE — PATIENT INSTRUCTIONS
No fractures noted on xray. Will follow with official read from radiology.   Apply ice for 20 minutes at a time every 2-3 hours   May take tylenol for pain  Elevate extremity

## 2023-11-29 NOTE — LETTER
November 29, 2023     Patient: Haley Palacios   YOB: 1980   Date of Visit: 11/29/2023       To Whom It May Concern: It is my medical opinion that Armando Hamilton may return to work on 12/1/2023 . If you have any questions or concerns, please don't hesitate to call.          Sincerely,        [employer], MARIA ELENA    CC: No Recipients

## 2023-12-13 ENCOUNTER — OFFICE VISIT (OUTPATIENT)
Dept: URGENT CARE | Facility: CLINIC | Age: 43
End: 2023-12-13
Payer: COMMERCIAL

## 2023-12-13 VITALS
BODY MASS INDEX: 33.96 KG/M2 | RESPIRATION RATE: 20 BRPM | OXYGEN SATURATION: 98 % | WEIGHT: 257.4 LBS | HEART RATE: 69 BPM | TEMPERATURE: 98.1 F | SYSTOLIC BLOOD PRESSURE: 148 MMHG | DIASTOLIC BLOOD PRESSURE: 82 MMHG

## 2023-12-13 DIAGNOSIS — S93.402D SPRAIN OF LEFT ANKLE, UNSPECIFIED LIGAMENT, SUBSEQUENT ENCOUNTER: Primary | ICD-10-CM

## 2023-12-13 PROCEDURE — 99213 OFFICE O/P EST LOW 20 MIN: CPT

## 2023-12-13 PROCEDURE — S9088 SERVICES PROVIDED IN URGENT: HCPCS

## 2023-12-13 RX ORDER — OXYCODONE HYDROCHLORIDE AND ACETAMINOPHEN 5; 325 MG/1; MG/1
1 TABLET ORAL EVERY 6 HOURS PRN
Qty: 20 TABLET | Refills: 0 | Status: SHIPPED | OUTPATIENT
Start: 2023-12-13 | End: 2023-12-18

## 2023-12-13 NOTE — PROGRESS NOTES
[facility]        NAME: Gentry Gann is a 37 y.o. male  : 1980    MRN: [id number]  DATE: 2023  TIME: 7:01 PM    Assessment and Plan   Sprain of left ankle, unspecified ligament, subsequent encounter [S93.402D]  1. Sprain of left ankle, unspecified ligament, subsequent encounter  Ambulatory Referral to Orthopedic Surgery    oxyCODONE-acetaminophen (Percocet) 5-325 mg per tablet        Patient has decreased reflexes of test of Achilles on the left side I do recommend following up with orthopedics as patient has continued severe pain and decreased range of motion secondary to the pain. Patient cannot take NSAIDs given stage IV kidney disease and diabetes. Will trial short course of Percocet to alleviate pain until patient can see orthopedic. Patient Instructions     Follow with orthopedics for evaluation and treatment. Follow up with PCP in 3-5 days. Proceed to  ER if symptoms worsen. Chief Complaint     Chief Complaint   Patient presents with    Ankle Pain     Left ankle and heel. Fell 2 weeks ago. Had x-rays done here. History of Present Illness       Patient is a 66-year-old male who presents the office today for continued left ankle pain. He states that he was seen here for a fall approximately 2 weeks ago and since has had continued pain in the left ankle worse with weightbearing or flexion of the foot. He notes that he is only able to work about 3 hours until the pain starts to become severe and when he wakes up in middle the night he has severe pain as well. Denies any new injury. Review of Systems   Review of Systems   Constitutional:  Negative for fever. Musculoskeletal:  Positive for arthralgias and gait problem. Negative for joint swelling. All other systems reviewed and are negative.         Current Medications       Current Outpatient Medications:     ALPRAZolam (XANAX) 1 mg tablet, Take by mouth daily at bedtime as needed for anxiety, Disp: , Rfl:     aspirin 81 mg chewable tablet, Chew 81 mg daily, Disp: , Rfl:     betamethasone, augmented, (DIPROLENE) 0.05 % gel, Apply topically 2 (two) times a day, Disp: 50 g, Rfl: 1    chlorthalidone (HYGROTEN) 50 MG tablet, Take 50 mg by mouth 2 (two) times a day, Disp: , Rfl:     cholecalciferol (VITAMIN D3) 1,000 units tablet, Take 1,000 Units by mouth 2 (two) times a day, Disp: , Rfl:     cloNIDine (CATAPRES) 0.3 mg tablet, Take 0.3 mg by mouth 3 (three) times a day, Disp: , Rfl:     doxazosin (CARDURA) 2 mg tablet, Take 2 mg by mouth daily at bedtime, Disp: , Rfl:     ferrous sulfate 325 (65 Fe) mg tablet, Take 325 mg by mouth 2 (two) times a day with meals  , Disp: , Rfl:     hydrALAZINE (APRESOLINE) 50 mg tablet, Take 50 mg by mouth 3 (three) times a day, Disp: , Rfl:     hydrOXYzine HCL (ATARAX) 25 mg tablet, Take 1 tablet (25 mg total) by mouth every 6 (six) hours as needed for itching, Disp: 20 tablet, Rfl: 0    insulin aspart (NovoLOG) 100 units/mL injection, Inject under the skin 3 (three) times a day before meals Sliding scale and carb count , Disp: , Rfl:     losartan (COZAAR) 100 MG tablet, Take 100 mg by mouth daily, Disp: , Rfl:     lovastatin (ALTOPREV) 40 MG 24 hr tablet, Take 40 mg by mouth daily at bedtime, Disp: , Rfl:     metoprolol succinate (TOPROL-XL) 100 mg 24 hr tablet, Take 100 mg by mouth every morning, Disp: , Rfl:     metoprolol succinate (TOPROL-XL) 50 mg 24 hr tablet, Take 50 mg by mouth every evening, Disp: , Rfl:     mupirocin (BACTROBAN) 2 % ointment, Apply topically 3 (three) times a day, Disp: 22 g, Rfl: 0    NIFEdipine ER (ADALAT CC) 60 MG 24 hr tablet, Take 60 mg by mouth 2 (two) times a day, Disp: , Rfl:     oxyCODONE-acetaminophen (Percocet) 5-325 mg per tablet, Take 1 tablet by mouth every 6 (six) hours as needed for moderate pain for up to 5 days Max Daily Amount: 4 tablets, Disp: 20 tablet, Rfl: 0    levocetirizine (XYZAL) 5 MG tablet, Take 1 tablet (5 mg total) by mouth every evening for 10 days, Disp: 10 tablet, Rfl: 0    predniSONE 10 mg tablet, 60mg daily x 4days, 50mg daily x4days, 40mg daily x4days, 30mg daily x4days, 20mg daily x4days, 10mg daily x 4days (Patient not taking: Reported on 11/29/2023), Disp: 84 tablet, Rfl: 0    predniSONE 20 mg tablet, Take 2 tablets daily for 3 days then one tablet daily for 3 days (Patient not taking: Reported on 11/29/2023), Disp: 10 tablet, Rfl: 0    Current Allergies     Allergies as of 12/13/2023 - Reviewed 12/13/2023   Allergen Reaction Noted    Spironolactone Myalgia 12/21/2016            The following portions of the patient's history were reviewed and updated as appropriate: allergies, current medications, past family history, past medical history, past social history, past surgical history and problem list.     Past Medical History:   Diagnosis Date    Chronic kidney failure, stage 4 (severe) (HCC)     Chronic pain     Diabetes mellitus ([address])     Hyperlipidemia     Hypertension     Neuropathy     Renal disorder     Sleep apnea        Past Surgical History:   Procedure Laterality Date    APPENDECTOMY      TONSILLECTOMY         History reviewed. No pertinent family history. Medications have been verified. Objective   /82   Pulse 69   Temp 98.1 °F (36.7 °C)   Resp 20   Wt 117 kg (257 lb 6.4 oz)   SpO2 98%   BMI 33.96 kg/m²        Physical Exam     Physical Exam  Vitals and nursing note reviewed. Constitutional:       Appearance: Normal appearance. He is normal weight. Cardiovascular:      Rate and Rhythm: Normal rate. Pulses: Normal pulses. Pulmonary:      Effort: Pulmonary effort is normal.   Musculoskeletal:         General: Tenderness and signs of injury present. Left ankle: Tenderness present. Decreased range of motion. Feet:       Comments: Pedal pulses +2. Less of a response with gastrocnemius test of the left ankle compared to the right.   Tenderness with palpation over the Achilles tendon. Skin:     General: Skin is warm. Capillary Refill: Capillary refill takes less than 2 seconds. Findings: Bruising present. Neurological:      Mental Status: He is alert.

## 2024-06-17 ENCOUNTER — HOSPITAL ENCOUNTER (EMERGENCY)
Facility: HOSPITAL | Age: 44
Discharge: HOME | End: 2024-06-17
Attending: STUDENT IN AN ORGANIZED HEALTH CARE EDUCATION/TRAINING PROGRAM
Payer: COMMERCIAL

## 2024-06-17 VITALS
TEMPERATURE: 97.9 F | BODY MASS INDEX: 33.78 KG/M2 | RESPIRATION RATE: 16 BRPM | SYSTOLIC BLOOD PRESSURE: 160 MMHG | DIASTOLIC BLOOD PRESSURE: 76 MMHG | OXYGEN SATURATION: 97 % | HEART RATE: 65 BPM | WEIGHT: 256 LBS

## 2024-06-17 DIAGNOSIS — H57.89 IRRITATION OF BOTH EYES: Primary | ICD-10-CM

## 2024-06-17 DIAGNOSIS — T26.00XA: ICD-10-CM

## 2024-06-17 DIAGNOSIS — T20.10XA: ICD-10-CM

## 2024-06-17 PROCEDURE — 99284 EMERGENCY DEPT VISIT MOD MDM: CPT | Performed by: STUDENT IN AN ORGANIZED HEALTH CARE EDUCATION/TRAINING PROGRAM

## 2024-06-17 PROCEDURE — 99283 EMERGENCY DEPT VISIT LOW MDM: CPT

## 2024-06-17 RX ORDER — TOBRAMYCIN AND DEXAMETHASONE 3; 1 MG/ML; MG/ML
2 SUSPENSION/ DROPS OPHTHALMIC ONCE
Status: COMPLETED | OUTPATIENT
Start: 2024-06-17 | End: 2024-06-17

## 2024-06-17 RX ADMIN — TOBRAMYCIN AND DEXAMETHASONE 2 DROP: 3; 1 SUSPENSION/ DROPS OPHTHALMIC at 23:01

## 2024-06-18 NOTE — DISCHARGE INSTRUCTIONS
Instill 2 drops of the Tobradex into each eye every 6 hours. You do not have to wake up from sleep to instill the drops.     Instill 2 drops into each eye when you wake up.     Follow up with the eye doctor tomorrow anywhere between 8 and 9:00 at the [facility].  The address is provided.    For pain, you can take Tylenol 1000 mg every 6 hours.
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
## 2024-07-12 ENCOUNTER — VBI (OUTPATIENT)
Dept: ADMINISTRATIVE | Facility: OTHER | Age: 44
End: 2024-07-12

## 2024-07-12 NOTE — TELEPHONE ENCOUNTER
07/12/24 10:02 AM     Chart reviewed for Diabetic Eye Exam was/were not submitted to the patient's insurance.     Jaida Bernal MA   [facility]

## 2025-05-15 ENCOUNTER — OFFICE VISIT (OUTPATIENT)
Dept: URGENT CARE | Facility: CLINIC | Age: 45
End: 2025-05-15
Payer: COMMERCIAL

## 2025-05-15 VITALS
TEMPERATURE: 98.1 F | WEIGHT: 262 LBS | HEART RATE: 58 BPM | RESPIRATION RATE: 18 BRPM | DIASTOLIC BLOOD PRESSURE: 74 MMHG | BODY MASS INDEX: 34.72 KG/M2 | SYSTOLIC BLOOD PRESSURE: 140 MMHG | OXYGEN SATURATION: 97 % | HEIGHT: 73 IN

## 2025-05-15 DIAGNOSIS — S39.012A STRAIN OF LUMBAR REGION, INITIAL ENCOUNTER: Primary | ICD-10-CM

## 2025-05-15 DIAGNOSIS — I12.0 HYPERTENSIVE CHRONIC KIDNEY DISEASE WITH STAGE 5 CHRONIC KIDNEY DISEASE OR END STAGE RENAL DISEASE (HCC): ICD-10-CM

## 2025-05-15 PROBLEM — N18.4 BENIGN HYPERTENSION WITH CKD (CHRONIC KIDNEY DISEASE) STAGE IV (HCC): Chronic | Status: ACTIVE | Noted: 2018-10-03

## 2025-05-15 PROBLEM — Z79.891 LONG-TERM CURRENT USE OF OPIATE ANALGESIC: Status: ACTIVE | Noted: 2020-02-18

## 2025-05-15 PROBLEM — Z15.89: Status: ACTIVE | Noted: 2023-03-14

## 2025-05-15 PROBLEM — G56.03 BILATERAL CARPAL TUNNEL SYNDROME: Status: ACTIVE | Noted: 2020-04-30

## 2025-05-15 PROBLEM — M21.6X2 ACQUIRED CAVOVARUS DEFORMITY OF BOTH FEET: Status: ACTIVE | Noted: 2021-03-13

## 2025-05-15 PROBLEM — M20.42 HAMMERTOE, BILATERAL: Status: ACTIVE | Noted: 2020-04-30

## 2025-05-15 PROBLEM — E10.3293: Status: ACTIVE | Noted: 2023-09-25

## 2025-05-15 PROBLEM — D63.1 ANEMIA OF CHRONIC RENAL FAILURE, STAGE 4 (SEVERE)  (HCC): Chronic | Status: ACTIVE | Noted: 2018-04-10

## 2025-05-15 PROBLEM — M21.6X1 ACQUIRED CAVOVARUS DEFORMITY OF BOTH FEET: Status: ACTIVE | Noted: 2021-03-13

## 2025-05-15 PROBLEM — M20.41 HAMMERTOE, BILATERAL: Status: ACTIVE | Noted: 2020-04-30

## 2025-05-15 PROBLEM — E10.3599 PROLIFERATIVE DIABETIC RETINOPATHY ASSOCIATED WITH TYPE 1 DIABETES MELLITUS (HCC): Chronic | Status: ACTIVE | Noted: 2018-07-19

## 2025-05-15 PROBLEM — G89.4 CHRONIC PAIN DISORDER: Status: ACTIVE | Noted: 2018-03-17

## 2025-05-15 PROBLEM — S99.921A INJURY OF RIGHT FOOT: Status: ACTIVE | Noted: 2018-03-05

## 2025-05-15 PROBLEM — N18.4 ANEMIA OF CHRONIC RENAL FAILURE, STAGE 4 (SEVERE)  (HCC): Chronic | Status: ACTIVE | Noted: 2018-04-10

## 2025-05-15 PROBLEM — E21.3 HYPERPARATHYROIDISM (HCC): Status: ACTIVE | Noted: 2022-10-15

## 2025-05-15 PROBLEM — H25.13 NUCLEAR SCLEROTIC CATARACT OF BOTH EYES: Status: ACTIVE | Noted: 2022-10-15

## 2025-05-15 PROBLEM — G62.9 POLYNEUROPATHY, UNSPECIFIED: Status: ACTIVE | Noted: 2021-03-13

## 2025-05-15 PROBLEM — E10.42 DM TYPE 1 WITH DIABETIC PERIPHERAL NEUROPATHY (HCC): Chronic | Status: ACTIVE | Noted: 2018-07-19

## 2025-05-15 PROBLEM — I12.9 BENIGN HYPERTENSION WITH CKD (CHRONIC KIDNEY DISEASE) STAGE IV (HCC): Chronic | Status: ACTIVE | Noted: 2018-10-03

## 2025-05-15 PROBLEM — Z79.4 CURRENT USE OF INSULIN (HCC): Chronic | Status: ACTIVE | Noted: 2018-09-17

## 2025-05-15 PROCEDURE — 99213 OFFICE O/P EST LOW 20 MIN: CPT | Performed by: PHYSICIAN ASSISTANT

## 2025-05-15 PROCEDURE — S9088 SERVICES PROVIDED IN URGENT: HCPCS | Performed by: PHYSICIAN ASSISTANT

## 2025-05-15 RX ORDER — SYRING-NEEDL,DISP,INSUL,0.3 ML 31 GX5/16"
SYRINGE, EMPTY DISPOSABLE MISCELLANEOUS
COMMUNITY
Start: 2025-03-05

## 2025-05-15 RX ORDER — BLOOD SUGAR DIAGNOSTIC
STRIP MISCELLANEOUS
COMMUNITY
Start: 2025-04-04

## 2025-05-15 RX ORDER — LANCETS 33 GAUGE
EACH MISCELLANEOUS
COMMUNITY
Start: 2025-04-24

## 2025-05-15 RX ORDER — CALCITRIOL 0.5 UG/1
CAPSULE, LIQUID FILLED ORAL
COMMUNITY
Start: 2025-05-09

## 2025-05-15 RX ORDER — SODIUM BICARBONATE 650 MG/1
TABLET ORAL
COMMUNITY
Start: 2025-04-30

## 2025-05-15 RX ORDER — METHOCARBAMOL 500 MG/1
250 TABLET, FILM COATED ORAL 3 TIMES DAILY
Qty: 10 TABLET | Refills: 0 | Status: SHIPPED | OUTPATIENT
Start: 2025-05-15

## 2025-05-15 NOTE — LETTER
May 15, 2025     Patient: Juan Cordova   YOB: 1980   Date of Visit: 5/15/2025       To Whom it May Concern:    Juan Cordova was seen in my clinic on 5/15/2025. He may return to work on 5/16/2025.    If you have any questions or concerns, please don't hesitate to call.         Sincerely,          Danielle Lee Seiple, PA-C        CC: No Recipients

## 2025-05-15 NOTE — PATIENT INSTRUCTIONS
Recommend rest, ice x 15 minutes several times per day, and anti inflammatories. Perform gentle stretches and range of motion exercises a few times a day.   May take tylenol as needed.  May take methocarbamol (robaxin) 1/3 to 1/2 tablet up to 3 times per day. May cause drowsiness.   Stay well hydrated.

## 2025-05-15 NOTE — PROGRESS NOTES
[facility]        NAME: Juan Cordova is a 45 y.o. male  : 1980    MRN: [id number]  DATE: May 15, 2025  TIME: 11:10 AM    Assessment and Plan   Strain of lumbar region, initial encounter [S39.012A]  1. Strain of lumbar region, initial encounter  methocarbamol (ROBAXIN) 500 mg tablet      2. Hypertensive chronic kidney disease with stage 5 chronic kidney disease or end stage renal disease (HCC)          The patient,  who has a significant PMH of end stage renal failure, among other comorbidities, presented with a lumbar strain secondary to lifting a heavy object last week.  I spoke with the pharmacist at [facility] and we discussed medications safe to take with end stage renal disease. The conclusion was that a small dose of methocarbamol by mouth would be safe for a short period of time.  Advised patient to take a half or third of a tablet of methocarbamol 1-2 times a day as needed.  Continue with conservative management.  Follow up with pain management if symptoms worsen    Patient Instructions       Follow up with PCP in 3-5 days.  Proceed to  ER if symptoms worsen.    If tests have been performed at [facility], our office will contact you with results if changes need to be made to the care plan discussed with you at the visit.  You can review your full results on [facility].    Chief Complaint     Chief Complaint   Patient presents with    Back Pain     Low back pain started last week after lifting an air compressor. Pain radiates to shoulders. Taking tylenol and using ice and heat as needed         History of Present Illness       Juan presents for evraluation of back pain that started 6 days ago. On the day it started, he was working in his basement and lifted an ~85 pound air compressor. He felt discomfort, but no twinges of pain. He got stiff sleeping that night. He took a few days off of work, but pain has still not improved.   Pain is rated 4-5 at rest and with movement.  "  Patient has tried ice and heat. Patient has taken tylenol. Cannot take ibuprofen due to kidney disease.   Has tried icy hot/bengay without relief. Also tried voltaren gel.     Back Pain  Pertinent negatives include no abdominal pain or fever.       Review of Systems   Review of Systems   Constitutional:  Negative for activity change, appetite change, fatigue and fever.   HENT:  Negative for congestion, ear pain, rhinorrhea, sinus pressure, sinus pain, sneezing, sore throat and trouble swallowing.    Respiratory:  Negative for cough, shortness of breath and wheezing.    Gastrointestinal:  Negative for abdominal pain, constipation, diarrhea, nausea and vomiting.   Musculoskeletal:  Positive for back pain.   Skin:  Negative for rash.         Current Medications     Current Medications[1]    Current Allergies     Allergies as of 05/15/2025 - Reviewed 05/15/2025   Allergen Reaction Noted    Spironolactone Myalgia 12/21/2016            The following portions of the patient's history were reviewed and updated as appropriate: allergies, current medications, past family history, past medical history, past social history, past surgical history and problem list.     Past Medical History:   Diagnosis Date    Chronic kidney failure, stage 4 (severe) (HCC)     Chronic pain     Diabetes mellitus (HCC)     Hyperlipidemia     Hypertension     Neuropathy     Renal disorder     Sleep apnea        Past Surgical History:   Procedure Laterality Date    APPENDECTOMY      TONSILLECTOMY         Family History   Problem Relation Age of Onset    Diabetes Mother     Stroke Mother     Heart attack Father          Medications have been verified.        Objective   /74   Pulse 58   Temp 98.1 °F (36.7 °C)   Resp 18   Ht 6' 1\" (1.854 m)   Wt 119 kg (262 lb)   SpO2 97%   BMI 34.57 kg/m²   No LMP for male patient.       Physical Exam     Physical Exam  Vitals and nursing note reviewed.   Constitutional:       General: He is awake.      " Appearance: Normal appearance. He is well-developed, well-groomed and normal weight. He is not ill-appearing.   HENT:      Head: Normocephalic.      Right Ear: External ear normal.      Left Ear: External ear normal.      Nose: Nose normal. No nasal deformity.      Mouth/Throat:      Lips: Pink. No lesions.      Mouth: Mucous membranes are moist.     Eyes:      General: Lids are normal.      Conjunctiva/sclera: Conjunctivae normal.      Pupils: Pupils are equal, round, and reactive to light.     Neck:      Thyroid: No thyromegaly.     Cardiovascular:      Rate and Rhythm: Normal rate and regular rhythm.      Heart sounds: Normal heart sounds. No murmur heard.  Pulmonary:      Effort: Pulmonary effort is normal.      Breath sounds: Normal breath sounds. No decreased breath sounds, wheezing, rhonchi or rales.   Abdominal:      General: Bowel sounds are normal.      Palpations: Abdomen is soft.      Tenderness: There is no abdominal tenderness.      Hernia: No hernia is present.     Musculoskeletal:      Cervical back: Normal range of motion and neck supple.      Comments: Palpable muscle spasm with tenderness to palpation over left paraspinal lumbar and thoracic muscles, worst in lumbar region   Lymphadenopathy:      Head:      Right side of head: No submandibular, tonsillar, preauricular or posterior auricular adenopathy.      Left side of head: No submandibular, tonsillar, preauricular or posterior auricular adenopathy.      Cervical: No cervical adenopathy.     Skin:     General: Skin is warm and dry.      Capillary Refill: Capillary refill takes less than 2 seconds.      Coloration: Skin is not pale.      Findings: No rash.     Neurological:      Mental Status: He is alert and oriented to person, place, and time.      Comments: CN II-X grossly intact.   Psychiatric:         Speech: Speech normal.         Behavior: Behavior normal. Behavior is cooperative.                        [1]   Current Outpatient  "Medications:     aspirin 81 mg chewable tablet, Chew 81 mg in the morning., Disp: , Rfl:     calcitriol (ROCALTROL) 0.5 MCG capsule, , Disp: , Rfl:     cloNIDine (CATAPRES) 0.3 mg tablet, Take 0.3 mg by mouth in the morning and 0.3 mg in the evening and 0.3 mg before bedtime., Disp: , Rfl:     ferrous sulfate 325 (65 Fe) mg tablet, Take 325 mg by mouth in the morning and 325 mg in the evening. Take with meals., Disp: , Rfl:     hydrALAZINE (APRESOLINE) 50 mg tablet, Take 50 mg by mouth in the morning and 50 mg in the evening and 50 mg before bedtime., Disp: , Rfl:     hydrOXYzine HCL (ATARAX) 25 mg tablet, Take 1 tablet (25 mg total) by mouth every 6 (six) hours as needed for itching, Disp: 20 tablet, Rfl: 0    insulin aspart (NovoLOG) 100 units/mL injection, Inject under the skin in the morning and at noon and in the evening. Inject before meals. Sliding scale and carb count., Disp: , Rfl:     Lancets (OneTouch Delica Plus Twcypl17J) MISC, , Disp: , Rfl:     losartan (COZAAR) 100 MG tablet, Take 100 mg by mouth in the morning., Disp: , Rfl:     lovastatin (ALTOPREV) 40 MG 24 hr tablet, Take 40 mg by mouth daily at bedtime, Disp: , Rfl:     methocarbamol (ROBAXIN) 500 mg tablet, Take 0.5 tablets (250 mg total) by mouth 3 (three) times a day, Disp: 10 tablet, Rfl: 0    metoprolol succinate (TOPROL-XL) 100 mg 24 hr tablet, Take 100 mg by mouth every morning, Disp: , Rfl:     metoprolol succinate (TOPROL-XL) 50 mg 24 hr tablet, Take 50 mg by mouth every evening, Disp: , Rfl:     mupirocin (BACTROBAN) 2 % ointment, Apply topically 3 (three) times a day, Disp: 22 g, Rfl: 0    NIFEdipine ER (ADALAT CC) 60 MG 24 hr tablet, Take 60 mg by mouth in the morning and 60 mg in the evening., Disp: , Rfl:     OneTouch Ultra test strip, , Disp: , Rfl:     sodium bicarbonate 650 mg tablet, , Disp: , Rfl:     TRUEplus Insulin Syringe 31G X 5/16\" 0.3 ML MISC, , Disp: , Rfl:     ALPRAZolam (XANAX) 1 mg tablet, Take by mouth daily at " bedtime as needed for anxiety (Patient not taking: Reported on 5/15/2025), Disp: , Rfl:     betamethasone, augmented, (DIPROLENE) 0.05 % gel, Apply topically 2 (two) times a day (Patient not taking: Reported on 5/15/2025), Disp: 50 g, Rfl: 1    chlorthalidone (HYGROTEN) 50 MG tablet, Take 50 mg by mouth 2 (two) times a day, Disp: , Rfl:     cholecalciferol (VITAMIN D3) 1,000 units tablet, Take 1,000 Units by mouth 2 (two) times a day (Patient not taking: Reported on 5/15/2025), Disp: , Rfl:     doxazosin (CARDURA) 2 mg tablet, Take 2 mg by mouth daily at bedtime (Patient not taking: Reported on 5/15/2025), Disp: , Rfl:     levocetirizine (XYZAL) 5 MG tablet, Take 1 tablet (5 mg total) by mouth every evening for 10 days, Disp: 10 tablet, Rfl: 0    predniSONE 10 mg tablet, 60mg daily x 4days, 50mg daily x4days, 40mg daily x4days, 30mg daily x4days, 20mg daily x4days, 10mg daily x 4days (Patient not taking: Reported on 5/15/2025), Disp: 84 tablet, Rfl: 0    predniSONE 20 mg tablet, Take 2 tablets daily for 3 days then one tablet daily for 3 days (Patient not taking: Reported on 5/15/2025), Disp: 10 tablet, Rfl: 0